# Patient Record
Sex: MALE | Race: WHITE | NOT HISPANIC OR LATINO | Employment: FULL TIME | URBAN - METROPOLITAN AREA
[De-identification: names, ages, dates, MRNs, and addresses within clinical notes are randomized per-mention and may not be internally consistent; named-entity substitution may affect disease eponyms.]

---

## 2017-05-30 ENCOUNTER — AMBULATORY - RIVER FALLS (OUTPATIENT)
Dept: FAMILY MEDICINE | Facility: CLINIC | Age: 37
End: 2017-05-30
Payer: COMMERCIAL

## 2018-02-15 ENCOUNTER — OFFICE VISIT - HEALTHEAST (OUTPATIENT)
Dept: FAMILY MEDICINE | Facility: CLINIC | Age: 38
End: 2018-02-15

## 2018-02-15 DIAGNOSIS — R05.9 COUGH: ICD-10-CM

## 2018-02-15 DIAGNOSIS — J20.9 ACUTE BRONCHITIS: ICD-10-CM

## 2018-02-15 DIAGNOSIS — J01.90 ACUTE SINUSITIS: ICD-10-CM

## 2018-02-15 LAB
FLUAV AG SPEC QL IA: NORMAL
FLUBV AG SPEC QL IA: NORMAL

## 2018-02-28 ENCOUNTER — COMMUNICATION - HEALTHEAST (OUTPATIENT)
Dept: FAMILY MEDICINE | Facility: CLINIC | Age: 38
End: 2018-02-28

## 2018-02-28 DIAGNOSIS — J20.9 ACUTE BRONCHITIS: ICD-10-CM

## 2019-06-16 ENCOUNTER — OFFICE VISIT - RIVER FALLS (OUTPATIENT)
Dept: FAMILY MEDICINE | Facility: CLINIC | Age: 39
End: 2019-06-16
Payer: COMMERCIAL

## 2019-06-16 ENCOUNTER — COMMUNICATION - RIVER FALLS (OUTPATIENT)
Dept: FAMILY MEDICINE | Facility: CLINIC | Age: 39
End: 2019-06-16
Payer: COMMERCIAL

## 2019-06-16 LAB
ALBUMIN UR-MCNC: ABNORMAL G/DL
ANION GAP SERPL CALCULATED.3IONS-SCNC: 12 MMOL/L (ref 5–18)
BACTERIA #/AREA URNS HPF: NORMAL /[HPF]
BASOPHILS # BLD MANUAL: 0.1 10*3/UL
BASOPHILS NFR BLD MANUAL: 0.5 %
BILIRUB UR QL STRIP: NEGATIVE
BUN SERPL-MCNC: 9 MG/DL (ref 8–25)
BUN/CREAT RATIO - HISTORICAL: 8 (ref 10–20)
CALCIUM SERPL-MCNC: 9.5 MG/DL (ref 8.5–10.5)
CHLORIDE BLD-SCNC: 100 MMOL/L (ref 98–110)
CO2 SERPL-SCNC: 27 MMOL/L (ref 21–31)
CREAT SERPL-MCNC: 1.08 MG/DL (ref 0.72–1.25)
EOSINOPHIL # BLD MANUAL: 0.1 10*3/UL
EOSINOPHIL NFR BLD MANUAL: 1 %
EPITHELIAL CELLS UR: NORMAL
ERYTHROCYTE [DISTWIDTH] IN BLOOD BY AUTOMATED COUNT: 16.8 % (ref 11.5–15.5)
GFR ESTIMATE EXT - HISTORICAL: >60
GLUCOSE BLD-MCNC: 99 MG/DL (ref 65–100)
GLUCOSE UR STRIP-MCNC: NEGATIVE MG/DL
HCT VFR BLD AUTO: 48.6 % (ref 37–53)
HGB BLD-MCNC: 16.9 G/DL (ref 13.5–17.5)
HGB UR QL STRIP: ABNORMAL
KETONES UR STRIP-MCNC: NEGATIVE MG/DL
LEUKOCYTE ESTERASE UR QL STRIP: ABNORMAL
LYMPHOCYTES # BLD MANUAL: 1.7 10*3/UL (ref 0.9–2.9)
LYMPHOCYTES NFR BLD MANUAL: 12.1 %
MCH RBC QN AUTO: 30.9 PG (ref 26–34)
MCHC RBC AUTO-ENTMCNC: 34.8 G/DL (ref 32–36)
MCV RBC AUTO: 89 FL (ref 80–100)
MONOCYTES # BLD MANUAL: 1.3 10*3/UL
MONOCYTES NFR BLD MANUAL: 9.5 %
NEUTROPHILS # BLD MANUAL: 10.5 10*3/UL (ref 1.7–7)
NEUTROPHILS NFR BLD MANUAL: 76.9 %
NITRATE UR QL: NEGATIVE
PH UR STRIP: 7.5 [PH] (ref 5–8)
PLATELET # BLD AUTO: 393 10*3/UL (ref 140–440)
PMV BLD: 9.2 FL (ref 6.5–11)
POTASSIUM BLD-SCNC: 3.8 MMOL/L (ref 3.5–5)
RBC # BLD AUTO: 5.47 10*6/UL (ref 4.3–5.9)
RBC #/AREA URNS AUTO: NORMAL /[HPF]
SODIUM SERPL-SCNC: 139 MMOL/L (ref 135–145)
SP GR UR STRIP: 1.01 (ref 1–1.03)
WBC # BLD AUTO: 13.6 10*3/UL (ref 4.5–11)
WBC #/AREA URNS AUTO: NORMAL /[HPF]
WBC CLUMPS #/AREA URNS HPF: PRESENT /[HPF]

## 2019-06-16 ASSESSMENT — MIFFLIN-ST. JEOR: SCORE: 1986.37

## 2019-06-19 LAB — BACTERIA SPEC CULT: NORMAL

## 2020-05-27 ENCOUNTER — OFFICE VISIT - RIVER FALLS (OUTPATIENT)
Dept: FAMILY MEDICINE | Facility: CLINIC | Age: 40
End: 2020-05-27
Payer: COMMERCIAL

## 2020-05-27 LAB
D DIMER PPP FEU-MCNC: 0.35
ERYTHROCYTE [DISTWIDTH] IN BLOOD BY AUTOMATED COUNT: 13.3 % (ref 11.5–15.5)
HCT VFR BLD AUTO: 50 % (ref 37–53)
HGB BLD-MCNC: 17.8 G/DL (ref 13.5–17.5)
MCH RBC QN AUTO: 32.3 PG (ref 26–34)
MCHC RBC AUTO-ENTMCNC: 35.6 G/DL (ref 32–36)
MCV RBC AUTO: 91 FL (ref 80–100)
PLATELET # BLD AUTO: 236 10*3/UL (ref 140–440)
PMV BLD: 9.4 FL (ref 6.5–11)
RBC # BLD AUTO: 5.51 10*6/UL (ref 4.3–5.9)
TROPONIN I - HISTORICAL: <0.01 NG/ML
WBC # BLD AUTO: 10.1 10*3/UL (ref 4.5–11)

## 2020-05-27 ASSESSMENT — MIFFLIN-ST. JEOR: SCORE: 1956.43

## 2021-02-12 ENCOUNTER — COMMUNICATION - RIVER FALLS (OUTPATIENT)
Dept: FAMILY MEDICINE | Facility: CLINIC | Age: 41
End: 2021-02-12
Payer: COMMERCIAL

## 2021-02-12 ENCOUNTER — OFFICE VISIT - RIVER FALLS (OUTPATIENT)
Dept: FAMILY MEDICINE | Facility: CLINIC | Age: 41
End: 2021-02-12
Payer: COMMERCIAL

## 2021-03-10 ENCOUNTER — COMMUNICATION - RIVER FALLS (OUTPATIENT)
Dept: FAMILY MEDICINE | Facility: CLINIC | Age: 41
End: 2021-03-10
Payer: COMMERCIAL

## 2021-04-09 ENCOUNTER — COMMUNICATION - RIVER FALLS (OUTPATIENT)
Dept: FAMILY MEDICINE | Facility: CLINIC | Age: 41
End: 2021-04-09
Payer: COMMERCIAL

## 2021-04-12 ENCOUNTER — OFFICE VISIT - RIVER FALLS (OUTPATIENT)
Dept: FAMILY MEDICINE | Facility: CLINIC | Age: 41
End: 2021-04-12
Payer: COMMERCIAL

## 2021-06-01 VITALS — BODY MASS INDEX: 30.4 KG/M2 | WEIGHT: 230.4 LBS

## 2021-06-26 NOTE — PROGRESS NOTES
Progress Notes by Daxa Lowry MD at 2/15/2018 12:00 PM     Author: Daxa Lowry MD Service: -- Author Type: Physician    Filed: 2/15/2018  2:20 PM Encounter Date: 2/15/2018 Status: Signed    : Daxa Lowry MD (Physician)       Provider wore a mask during this visit.     Subjective:   Joe Jesus is a 37 y.o. male and is new to [facility].  Roomed by: Clare GRIFFIN     Refills needed? No    Do you have any forms that need to be filled out? No      Chief Complaint   Patient presents with   ? Cough     runny nose, sinus pressure, SOB, fatigue 5/d's,  no sore throat, tightness in chest coughing up red mucos   Sick with a cold around  and got better. Then sick with cold symptoms early January and got better. Then sick with another cold around  and felt fatigued and left work early. Then got better went to [place] from - and felt better. Then started feeling bad on . Has been feeling fatigued and having lots of nasal congestion since . Has been coughing mucous up. Last night started coughing worse. Admits chest tightness, SOB and fatigue. Says the burning the lungs has worried him. 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 Says he doesn't cough all of the time, but when he does, it's in fits. Denies any sweats or chills, headaches or dizziness. . Admits body aches on and off since . Says sense of smell and taste has been down. Denies nausea, vomiting, diarrhea or belly pain.     PMH - ADHD  PSH -  As child - T&A,  - knee surgeries,  - right torn biceps.  - back surgery for bulging disc  FX - HTN - neg, DM - neg, Cancer - neg, CAD - paternal GF  of MI age 50  Review of Systems  Const - Resp - see HPI  No Known Allergies    Current Outpatient Prescriptions:   ?  dextroamphetamine-amphetamine (ADDERALL XR) 30 MG 24 hr capsule, Take 20 mg by mouth daily. Pt. Taking 2 times a day, Disp: , Rfl:   There is no problem list on file for this  patient.    Objective:     Vitals:    02/15/18 1210   BP: 132/80   Patient Site: Right Arm   Patient Position: Sitting   Cuff Size: Adult Large   Pulse: (!) 102   Resp: 18   Temp: 98.3  F (36.8  C)   TempSrc: Oral   SpO2: 98%   Weight: (!) 230 lb 6.4 oz (104.5 kg)   Gen - Pt in NAD  Eyes - Conjunctiva non injected, no drainage  Face - not TTP over frontal sinus areas; not TTP over maxillary area  Ears - external canals - no induration, Right TM - not injected, Left TM - not injected   Nose - not congested, no nasal drainage  Pharynx - non injected, tonsils 1+ size  Neck - supple, no cervical adenopathy, no masses  Cor - RRR w/o murmur  Lungs - Fair to good air entry; spare diffuse expiratory wheezes, but no crackles noted on auscultation - sporadic dry coughing noted  Skin - no lesions, no rashes noted    Results for orders placed or performed in visit on 02/15/18   Influenza A/B Rapid Test   Result Value Ref Range    Influenza  A, Rapid Antigen No Influenza A antigen detected No Influenza A antigen detected    Influenza B, Rapid Antigen No Influenza B antigen detected No Influenza B antigen detected   Lab result discussed on day of visit.      Assessment - Plan   Medical Decision Making -37-year-old new male patient to [facility] presents with having had several episodes of URI symptoms since [place] time.  Admits that the last time that he got sick with URI symptoms since 1/26, him his symptoms have actually gotten worse and he started to having more cough, chest tightness and shortness of breath.  Patient also has symptoms of sinusitis with nasal congestion, fatigue and decreased sense of smell.  Patient did have some wheezing expiratory wheezing on exam and admitted feeling much better after getting an albuterol neb.  We will treat patient for acute sinusitis with antibiotics and acute bronchitis with an albuterol inhaler.    1. Acute sinusitis  - doxycycline (VIBRA-TABS) 100 MG tablet; Take 1 tablet (100 mg  total) by mouth 2 (two) times a day for 7 days.  Dispense: 14 tablet; Refill: 0    2. Acute bronchitis  - albuterol nebulizer solution 2.5 mg (PROVENTIL); Take 3 mL (2.5 mg total) by nebulization once.  - albuterol (PROAIR HFA;PROVENTIL HFA;VENTOLIN HFA) 90 mcg/actuation inhaler; Inhale 1-2 puffs every 4 (four) hours as needed for wheezing or shortness of breath (or coughing).  Dispense: 1 Inhaler; Refill: 0  - Spacer W/O Mask    3. Cough  - Influenza A/B Rapid Test    At the conclusion of the encounter, assessment and plan were discussed. All questions were answered. The patient or guardian acknowledged understanding and was involved in the decision making regarding the overall care plan.    Patient Instructions     1. Keep well hydrated  2.  May alternate Tylenol every 6 hours with ibuprofen every 6 hours as needed for fever or pain  3. If symptoms not improved after completing antibiotics, follow up with primary  4. If you have any questions, call the clinic number - it's answered 24/7      Sinus infection  Acute bacterial rhinosinusitis (ABRS) is an infection of your nasal cavity and sinuses. Its caused by bacteria. Acute means that youve had symptoms for less than 12 weeks.  Understanding your sinuses  The nasal cavity is the large air-filled space behind your nose. The sinuses are a group of spaces formed by the bones of your face. They connect with your nasal cavity. ABRS causes the tissue lining these spaces to become inflamed. Mucus may not drain normally. This leads to facial pain and other symptoms.  What causes ABRS?  ABRS most often follows an upper respiratory infection caused by a virus. Bacteria then infect the lining of your nasal cavity and sinuses. But you can also get ABRS if you have:    Nasal allergies    Long-term nasal swelling and congestion not caused by allergies    Blockage in the nose  Symptoms of ABRS  The symptoms of ABRS may be different for each person, and can include:    Nasal  congestion    Runny nose    Fluid draining from the nose down the throat (postnasal drip)    Headache    Cough    Pain in the sinuses    Thick, colored fluid from the nose (mucus)    Fever  Diagnosing ABRS  ABRS may be diagnosed if youve had an upper respiratory infection like a cold and cough for longer than 10 to 14 days. Your health care provider will ask about your symptoms and your medical history. The provider will check your vital signs, including your temperature. Youll have a physical exam. The health care provider will check your ears, nose, and throat. You likely wont need any tests. If ABRS comes back, you may have a culture or other tests.  Treatment for ABRS  Treatment may include:    Antibiotic medicine. This is for symptoms that last for at least 10 to 14 days.    Nasal corticosteroid medicine. Drops or spray used in the nose can lessen swelling and congestion.    Over-the-counter pain medicine. This is to lessen sinus pain and pressure.    Nasal decongestant medicine. Spray or drops may help to lessen congestion. Do not use them for more than a few days.    Salt wash (saline irrigation). This can help to loosen mucus.  Possible complications of ABRS  ABRS may come back or become long-term (chronic).  In rare cases, ABRS may cause complications such as:     Inflamed tissue around the brain and spinal cord (meningitis)    Inflamed tissue around the eyes (orbital cellulitis)    Inflamed bones around the sinuses (osteitis)  These problems may need to be treated in a hospital with intravenous (IV) antibiotic medicine or surgery.  When to call the health care provider  Call your health care provider if you have any of the following:    Symptoms that dont get better, or get worse    Symptoms that dont get better after 3 to 5 days on antibiotics    Trouble seeing    Swelling around your eyes    Confusion or trouble staying awake   Date Last Reviewed: 3/3/2015    5016-0219 [employer] [address]  [place] [address]. All rights reserved. This information is not intended as a substitute for professional medical care. Always follow your healthcare professional's instructions.      What Is Acute Bronchitis?  Acute or short-term bronchitis last for days or weeks. It occurs when the bronchial tubes (airways in the lungs) are irritated by a virus, bacteria, or allergen. This causes a cough that produces yellow or greenish mucus.  Inside healthy lungs    Air travels in and out of the lungs through the airways. The linings of these airways produce sticky mucus. This mucus traps particles that enter the lungs. Tiny structures called cilia then sweep the particles out of the airways.     Healthy airway: Airways are normally open. Air moves in and out easily.      Healthy cilia: Tiny, hairlike cilia sweep mucus and particles up and out of the airways.   Lungs with bronchitis  Bronchitis often occurs with a cold or the flu virus. The airways become inflamed (red and swollen). There is a deep hacking cough from the extra mucus. Other symptoms may include:    Wheezing    Chest discomfort    Shortness of breath    Mild fever  A second infection, this time due to bacteria, may then occur. And airways irritated by allergens or smoke are more likely to get infected.        Inflamed airway: Inflammation and extra mucus narrow the airway, causing shortness of breath.      Impaired cilia: Extra mucus impairs cilia, causing congestion and wheezing. Smoking makes the problem worse.   Making a diagnosis  A physical exam, health history, and certain tests help your healthcare provider make the diagnosis.  Health history  Your healthcare provider will ask you about your symptoms.  The exam  Your provider listens to your chest for signs of congestion. He or she may also check your ears, nose, and throat.  Possible tests    A sputum test for bacteria. This requires a sample of mucus from the lungs.    A nasal or  throat swab for bacterial infection.    A chest X-ray if your healthcare provider thinks you have pneumonia.    Tests to check for an underlying condition, such as allergies, asthma, or COPD. You may need to see a specialist for more lung function testing.  Treating a cough  The main treatment for bronchitis is easing symptoms. Avoiding smoke, allergens, and other things that trigger coughing can often help. If the infection is bacterial, you may be given antibiotics. During the illness, it's important to get plenty of sleep. To ease symptoms:    Dont smoke, and avoid secondhand smoke.    Use a humidifier, or breathe in steam from a hot shower. This may help loosen mucus.    Drink a lot of water and juice. They can soothe the throat and may help thin mucus.    Sit up or use extra pillows when in bed to help lessen coughing and congestion.    Ask your provider about using cough medicine, pain and fever medicine, or a decongestant.  Antibiotics  Most cases of bronchitis are caused by cold or flu viruses. Antibiotics dont treat viral illness. Taking antibiotics when they are not needed increases your risk of getting an infection later that is antibiotic-resistant. Your provider will prescribe antibiotics if the infection is caused by bacteria. If they are prescribed:    Take the medicine until it is used up, even if symptoms have improved. If you dont, the bronchitis may come back.    Take them as directed. For instance, some medicines should be taken with food.    Ask your provider or pharmacist what side effects are common, and what to do about them.  Follow-up care  You should see your provider again in 2 to 3 weeks. By this time, symptoms should have improved. An infection that lasts longer may mean you have a more serious problem.  Prevention    Avoid tobacco smoke. If you smoke, quit. Stay away from smoky places. Ask friends and family not to smoke around you, or in your home or car.    Get checked  for allergies.    Ask your provider about getting a yearly flu shot, and pneumococcal or pneumonia shots.    Wash your hands often. This helps reduce the chance of picking up viruses that cause colds and flu.  Call your healthcare provider if:    Symptoms worsen, or new symptoms develop.    Breathing problems worsen or  become severe.    Symptoms dont get better within a week, or within 3 days of taking antibiotics.   Date Last Reviewed: 6/18/2014 2000-2016 [employer]. [address] [place] [address], [place] [address]. All rights reserved. This information is not intended as a substitute for professional medical care. Always follow your healthcare professional's instructions.

## 2021-07-14 ENCOUNTER — OFFICE VISIT - RIVER FALLS (OUTPATIENT)
Dept: FAMILY MEDICINE | Facility: CLINIC | Age: 41
End: 2021-07-14
Payer: COMMERCIAL

## 2021-07-16 ENCOUNTER — COMMUNICATION - RIVER FALLS (OUTPATIENT)
Dept: FAMILY MEDICINE | Facility: CLINIC | Age: 41
End: 2021-07-16
Payer: COMMERCIAL

## 2021-11-17 ENCOUNTER — OFFICE VISIT - RIVER FALLS (OUTPATIENT)
Dept: FAMILY MEDICINE | Facility: CLINIC | Age: 41
End: 2021-11-17
Payer: COMMERCIAL

## 2022-02-12 VITALS
BODY MASS INDEX: 29.55 KG/M2 | HEART RATE: 99 BPM | TEMPERATURE: 98.1 F | DIASTOLIC BLOOD PRESSURE: 92 MMHG | WEIGHT: 223 LBS | OXYGEN SATURATION: 96 % | SYSTOLIC BLOOD PRESSURE: 140 MMHG | HEIGHT: 73 IN

## 2022-02-12 VITALS
BODY MASS INDEX: 30.43 KG/M2 | HEART RATE: 82 BPM | WEIGHT: 229.6 LBS | SYSTOLIC BLOOD PRESSURE: 140 MMHG | DIASTOLIC BLOOD PRESSURE: 90 MMHG | TEMPERATURE: 98.7 F | HEIGHT: 73 IN

## 2022-02-15 NOTE — TELEPHONE ENCOUNTER
---------------------  From: Victoria Amato MA (eRx Pool ([id number]_[facility]))   To: MARIBELL Message Pool ([id number]_[place] [facility] [place]);     Sent: 9/14/2021 9:32:11 AM CDT  Subject: FW: Medication Management   Due Date/Time: 9/14/2021 1:31:00 PM CDT           ------------------------------------------  From: [id number]  To: Kevin Izaguirre MD  Sent: September 12, 2021 1:31:12 PM CDT  Subject: Medication Management  Due: August 20, 2021 11:16:59 AM CDT     ** On Hold Pending Signature **     Dispensed Drug: sildenafil (sildenafil 20 mg oral tablet), TAKE 1 TABLET BY MOUTH EVERY DAY  Quantity: 60 tab(s)  Days Supply: 60  Refills: 0  Substitutions Allowed  Notes from Pharmacy:  ---------------------------------------------------------------  From: Victoria Amato MA   To: [facility] [id number] [facility]    Sent: 9/14/2021 10:46:46 AM CDT  Subject: FW: Medication Management     ** Submitted: **  Order:sildenafil (sildenafil 20 mg oral tablet)  1 tab(s)  Oral  daily  Qty:  30 tab(s)        Refills:  1          Substitutions Allowed     Route To [facility] [id number] [facility]    Signed by Victoria Amato MA  9/14/2021 3:46:00 PM [facility]    ** Submitted: **  Complete:sildenafil (sildenafil 20 mg oral tablet)   Signed by Victoria Amato MA  9/14/2021 3:46:00 PM [facility]    ** Not Approved:  **  sildenafil (SILDENAFIL 20 MG TABLET)  TAKE 1 TABLET BY MOUTH EVERY DAY  Qty:  60 tab(s)        Days Supply:  60        Refills:  0          Substitutions Allowed     Route To Pharmacy - [facility] [id number]   Signed by Victoria Amato MA

## 2022-02-15 NOTE — PROGRESS NOTES
Patient:   TOM MICHELE            MRN: [id number]            FIN: [id number]               Age:   40 years     Sex:  Male     :  1980   Associated Diagnoses:   Mild recurrent major depression; Obsessive-Compulsive Disorder; Adult ADHD   Author:   Phillip Oliveros PA-C      Visit Information      Date of Service: 2021 12:22 pm  Performing Location: [facility]  Encounter#: [id number]      Primary Care Provider (PCP):  Kevin Izaguirre MD    NPI# [id number]      Referring Provider:  Phillip Oliveros PA-C    NPI# [id number]   Visit type:  Video Visit via [employer] or [employer].    Participants in room during visit:  2_   Location of patient:  Parked vehicle in [place]  Location of provider:  _ (Clinic office )  Video Start Time:    Video End Time:   1430    Today's visit was conducted via video conference due to the COVID-19 pandemic.  The patient's consent to proceed with a video visit has been obtained and documented.      Chief Complaint   Medication refills      History of Present Illness   Patient is a 40 year old M who is being evaluated via a billable video visit. Needs medications refilled. Primary [facility]. Was seeing psychiatry. They apparently went out of business. Unable to get into them. Reviewed his medications and PDMP. Not suicidal. Doing well. Will FU with [facility] going forward unless new clinic has psychiatry. Wife feels he needs to go back on his Lexapro.      Review of Systems   Constitutional:  Negative.    Eye:  Negative.    Ear/Nose/Mouth/Throat:  Negative.    Respiratory:  Negative.    Cardiovascular:  Negative.    Gastrointestinal:  Negative.    Genitourinary:  Negative.    Immunologic:  Negative.    Musculoskeletal:  Negative.    Integumentary:  Negative.    Neurologic:  Negative.    Psychiatric:  Negative except as documented in history of present illness.       Health Status   Allergies:    Allergic Reactions (Selected)  No known allergies   Medications:  (Selected)    Prescriptions  Prescribed  Lexapro 10 mg oral tablet: = 1 tab(s) ( 10 mg ), Oral, daily, # 90 tab(s), 3 Refill(s), Type: Maintenance, Pharmacy[id number], 1 tab(s) Oral daily, 72.75, in, 05/27/20 10:28:00 CDT, Height Measured, 223, lb, 05/27/20 10:28:00 CDT, Weight Measured  amphetamine-dextroamphetamine 30 mg oral tablet: = 1 tab(s) ( 30 mg ), Oral, bid, Instructions: Fill in thirty days, # 60 tab(s), 0 Refill(s), Type: Maintenance, Pharmacy[id number], 1 tab(s) Oral bid,x30 day(s),Instr:Fill in thirty days, 72.75, in, 05/27/20 10:28:00 CDT, Height Measured,...  Documented Medications  Documented  amphetamine-dextroamphetamine 15 mg oral tablet: 0 Refill(s), Type: Soft Stop  sildenafil 20 mg oral tablet: = 1 tab(s) ( 20 mg ), Oral, daily, 0 Refill(s), Type: Maintenance   Problem list:    All Problems  Tobacco Use Disorder, Continuous / ICD-9-.1 / Confirmed  Tinea corporis / ICD-9-.5 / Confirmed  Sprain of Neck / ICD-9-.0 / Confirmed  Rotator Cuff Tendinitis / ICD-9-.10 / Confirmed  Obsessive-Compulsive Disorder / ICD-9-.3 / Confirmed  Obese / ICD-9-.00 / Probable  Degenerative Disc Disease / ICD-9-.6 / Confirmed  Cervical Pain / ICD-9-.1 / Confirmed  Cervical disc degeneration / ICD-9-.4 / Confirmed      Histories   Past Medical History:    Active  Cervical Pain (723.1)  Cervical disc degeneration (722.4)  Sprain of Neck (847.0)  Tinea corporis (110.5)  Tobacco Use Disorder, Continuous (305.1)  Obsessive-Compulsive Disorder (300.3)  Degenerative Disc Disease (722.6)  Comments:  7/9/2010 CDT 12:35 PM CDT - Suly Estrada CMA  C4-5  Rotator Cuff Tendinitis (726.10)   Family History:       Procedure history:    History of knee surgery ([id number]).  Comments:  9/17/2010 2:25 PM CDT - Estela Carey   Social History:        Electronic Cigarette/Vaping Assessment            Electronic Cigarette Use: Never.      Alcohol Assessment: Denies  Alcohol Use      Tobacco Assessment: Current            chewing tobacco, Snuff, 10 year(s).      Substance Abuse Assessment: Denies Substance Abuse      Exercise and Physical Activity Assessment: Regular exercise        Physical Examination   General:  Alert and oriented, No acute distress.    Eye:  Pupils are equal, round and reactive to light, Normal conjunctiva.    HENT:  Oral mucosa is moist.    Neck:  Supple.    Respiratory:  Respirations are non-labored.    Psychiatric:  Cooperative, Appropriate mood & affect, Normal judgment.       Impression and Plan   Diagnosis     Mild recurrent major depression (YWI45-CZ F33.0).     Obsessive-Compulsive Disorder (FTX41-PJ F42.9).     Adult ADHD (PDR86-CC F90.9).     Patient Instructions:       Counseled: Patient, Regarding diagnosis, Regarding treatment, Regarding medications, Verbalized understanding.    Orders     Orders (Selected)   Prescriptions  Prescribed  Lexapro 10 mg oral tablet: = 1 tab(s) ( 10 mg ), Oral, daily, # 90 tab(s), 3 Refill(s), Type: Maintenance, Pharmacy: [facility], 1 tab(s) Oral daily, 72.75, in, 05/27/20 10:28:00 CDT, Height Measured, 223, lb, 05/27/20 10:28:00 CDT, Weight Measured  amphetamine-dextroamphetamine 30 mg oral tablet: = 1 tab(s) ( 30 mg ), Oral, bid, Instructions: Fill in thirty days, # 60 tab(s), 0 Refill(s), Type: Maintenance, Pharmacy: [facility], 1 tab(s) Oral bid,x30 day(s),Instr:Fill in thirty days, 72.75, in, 05/27/20 10:28:00 CDT, Height Measured,...  Documented Medications  Deleted  Adderall 15 mg oral tablet: = 1 tab(s) ( 15 mg ), Oral, daily, 0 Refill(s), Type: Maintenance  Adderall 30 mg oral tablet: = 1 tab(s) ( 30 mg ), Oral, bid, # 60 tab(s), 0 Refill(s), Type: Maintenance.     Plan:  RTC in sixty days and PRN..       Health Maintenance      Recommendations     Pending (in the next year)        OverDue           Alcohol Misuse Screen due  07/09/11  and every 1  year(s)           Depression Screen  (Male) due  03/08/12  and every 1  year(s)           Influenza Vaccine due  09/01/20  and every 1  year(s)        Due            Exercise due  02/12/21  and every 1  year(s)           Lipid Disorders Screen (Male) due  02/12/21  and every 1  year(s)           Preventative Services due  02/12/21  and every 5  year(s)           Type 2 Diabetes Mellitus Screen (Male) due  02/12/21  Variable frequency        Due In Future            Body Mass Index Check (Male) not due until  05/27/21  and every 1  year(s)           High Blood Pressure Screen (Male) not due until  05/27/21  and every 1  year(s)           Tetanus Vaccine not due until  07/01/21  and every 10  year(s)     Satisfied (in the past 1 year)        Satisfied            Body Mass Index Check (Male) on  05/27/20.           High Blood Pressure Screen (Male) on  05/27/20.           Obesity Screen and Counseling (Male) on  05/27/20.

## 2022-02-15 NOTE — NURSING NOTE
Comprehensive Intake Entered On:  4/12/2021 8:41 AM CDT    Performed On:  4/12/2021 8:37 AM CDT by Svitlana Garza LPN               Summary   Chief Complaint :   verbal consent given for video visit. Medication check and refills.   Svitlana Garza LPN - 4/12/2021 8:37 AM CDT   Health Status   Allergies Verified? :   Yes   Medication History Verified? :   Yes   Immunizations Current :   Yes   Pre-Visit Planning Status :   Completed   Tobacco Use? :   Current every day smoker   Tobacco Cessation Review :   Not ready to quit, Advised to quit   Svitlana Garza LPN - 4/12/2021 8:37 AM CDT   Meds / Allergies   (As Of: 4/12/2021 8:41:12 AM CDT)   Allergies (Active)   No known allergies  Estimated Onset Date:   Unspecified ; Created By:   Suly Estrada CMA; Reaction Status:   Active ; Category:   Drug ; Substance:   No known allergies ; Type:   Allergy ; Updated By:   Suly Estrada CMA; Reviewed Date:   4/12/2021 8:38 AM CDT        Medication List   (As Of: 4/12/2021 8:41:12 AM CDT)   Prescription/Discharge Order    amphetamine-dextroamphetamine  :   amphetamine-dextroamphetamine ; Status:   Prescribed ; Ordered As Mnemonic:   amphetamine-dextroamphetamine 15 mg oral tablet ; Simple Display Line:   15 mg, 1 tab(s), Oral, daily, 30 tab(s), 0 Refill(s) ; Ordering Provider:   Kevin Izaguirre MD; Catalog Code:   amphetamine-dextroamphetamine ; Order Dt/Tm:   3/10/2021 1:01:56 PM CST          amphetamine-dextroamphetamine  :   amphetamine-dextroamphetamine ; Status:   Prescribed ; Ordered As Mnemonic:   amphetamine-dextroamphetamine 30 mg oral tablet ; Simple Display Line:   30 mg, 1 tab(s), Oral, bid, 60 tab(s), 0 Refill(s) ; Ordering Provider:   Kevin Izaguirre MD; Catalog Code:   amphetamine-dextroamphetamine ; Order Dt/Tm:   3/10/2021 1:02:13 PM CST          amphetamine-dextroamphetamine  :   amphetamine-dextroamphetamine ; Status:   Prescribed ; Ordered As Mnemonic:   amphetamine-dextroamphetamine 30 mg oral  tablet ; Simple Display Line:   30 mg, 1 tab(s), Oral, bid, for 30 day(s), Fill in thirty days, 60 tab(s), 0 Refill(s) ; Ordering Provider:   Phillip Oliveros PA-C; Catalog Code:   amphetamine-dextroamphetamine ; Order Dt/Tm:   2/12/2021 1:37:02 PM CST ; Comment:   Responsible Provider: DICKSON BARBER          escitalopram  :   escitalopram ; Status:   Prescribed ; Ordered As Mnemonic:   Lexapro 10 mg oral tablet ; Simple Display Line:   10 mg, 1 tab(s), Oral, daily, 90 tab(s), 3 Refill(s) ; Ordering Provider:   Phillip Oliveros PA-C; Catalog Code:   escitalopram ; Order Dt/Tm:   2/12/2021 1:35:28 PM CST            Home Meds    amphetamine-dextroamphetamine  :   amphetamine-dextroamphetamine ; Status:   Documented ; Ordered As Mnemonic:   amphetamine-dextroamphetamine 15 mg oral tablet ; Simple Display Line:   0 Refill(s) ; Catalog Code:   amphetamine-dextroamphetamine ; Order Dt/Tm:   2/12/2021 1:27:55 PM CST ; Comment:   Responsible Provider: DICKSON BARBER          sildenafil  :   sildenafil ; Status:   Documented ; Ordered As Mnemonic:   sildenafil 20 mg oral tablet ; Simple Display Line:   20 mg, 1 tab(s), Oral, daily, 0 Refill(s) ; Catalog Code:   sildenafil ; Order Dt/Tm:   6/16/2019 4:30:39 PM CDT            ID Risk Screen   Recent Travel History :   No recent travel   Family Member Travel History :   No recent travel   Other Exposure to Infectious Disease :   Exposure to respiratory illness of unknown etiology   COVID-19 Testing Status :   Positive COVID-19 test greater than 30 days ago   Svitlana Garza LPN - 4/12/2021 8:37 AM CDT

## 2022-02-15 NOTE — RESULTS
Patient:   TOM MICHELE            MRN: [id number]            FIN: [id number]               Age:   39 years     Sex:  Male     :  1980   Associated Diagnoses:   None   Author:   Zina GUZMAN, Kevin      Procedure   EKG procedure   Date:  2020.     Indication: chest pain.     EKG findings   Rhythm: heart rate  98  beats/min, sinus normal.     Axis: normal axis, normal configuration.     Intervals: normal.     P waves: normal.     QRS complex: normal.     ST-T-U complex: non-specific ST-T wave abnormalities.     Interpretation: borderline.

## 2022-02-15 NOTE — TELEPHONE ENCOUNTER
Entered by Daniela Miguel on March 10, 2021 9:51:48 AM CST  LF 2/21/21 #60  LF 2/21/21 video visit  No RTC  Please advise           ---------------------  From: TOM MICHELE  To: [facility]  Sent: 03/10/2021 08:59 a.m. CST  Subject: Refill  Hello, I m messaging to request a refill on my amphetamine salt combo. My usual dosage is 60 30mg tablets and 30 15mg tablets. I did get the 30 s last month. Thank you. Please call if you have any questions.---------------------  From: Daniela Miguel ([employer] [facility] ([id number]))   To: [facility] ([id number]);     Sent: 3/10/2021 9:51:56 AM CST  Subject: FW: Refill---------------------  From: Victoria Amato MA ([employer] ([id number]))   To: Kevin Izaguirre MD;     Sent: 3/10/2021 10:19:30 AM CST  Subject: FW: Refill  ** Submitted: **  Order:amphetamine-dextroamphetamine (amphetamine-dextroamphetamine 30 mg oral tablet)  1 tab(s)  Oral  bid  Qty:  60 tab(s)        Refills:  0          Substitutions Allowed     [id number] [place]    Signed by Kevin Izaguirre MD  3/10/2021 7:01:00 PM [facility]    ** Submitted: **  Order:amphetamine-dextroamphetamine (amphetamine-dextroamphetamine 15 mg oral tablet)  1 tab(s)  Oral  daily  Qty:  30 tab(s)        Refills:  0          Substitutions Allowed     [id number]    Signed by Kevin Izaguirre MD  3/10/2021 7:01:00 PM [facility]---------------------  From: Kevin Izaguirre MD   To: [id number]);     Sent: 3/10/2021 1:05:41 PM CST  Subject: RE: Refill     He will need a visit next month---------------------  From: Lima COLON, Victoria ([facility] Message Pool ([id number]_[facility]))   To: TOM MICHELE    Sent: 3/10/2021 1:27:06 PM CST  Subject: FW: Refill     Dr. Zina Chase received your message and did send in prescription refill to [facility].  He does want to see you next month for  your next set of refills.    Sincerely,     Victoria CULLEN CMA

## 2022-02-15 NOTE — NURSING NOTE
Comprehensive Intake Entered On:  6/16/2019 4:32 PM CDT    Performed On:  6/16/2019 4:24 PM CDT by Emma Castle CMA               Summary   Chief Complaint :   did have blood in urine, foul smelling urine since Tues but has since improved, RLQ pain yesterday/lower back pain, constipation since Friday     Weight Measured :   229.6 lb(Converted to: 229 lb 10 oz, 104.14 kg)    Height Measured :   72.75 in(Converted to: 6 ft 1 in, 184.78 cm)    Body Mass Index :   30.5 kg/m2 (HI)    Body Surface Area :   2.31 m2   Systolic Blood Pressure :   140 mmHg (HI)    Diastolic Blood Pressure :   90 mmHg (HI)    Mean Arterial Pressure :   107 mmHg   Peripheral Pulse Rate :   82 bpm   BP Site :   Right arm   Pulse Site :   Radial artery   BP Method :   Manual   HR Method :   Manual   Temperature Tympanic :   98.7 DegF(Converted to: 37.1 DegC)    Emma Castle CMA - 6/16/2019 4:24 PM CDT   Health Status   Allergies Verified? :   Yes   Medication History Verified? :   Yes   Immunizations Current :   Yes   Medical History Verified? :   No   Pre-Visit Planning Status :   Not completed   Tobacco Use? :   Unknown if ever smoked   Emma Castle CMA - 6/16/2019 4:24 PM CDT   Meds / Allergies   (As Of: 6/16/2019 4:32:25 PM CDT)   Allergies (Active)   No known allergies  Estimated Onset Date:   Unspecified ; Created By:   Suly Estrada CMA; Reaction Status:   Active ; Category:   Drug ; Substance:   No known allergies ; Type:   Allergy ; Updated By:   Suly Estrada CMA; Reviewed Date:   6/16/2019 4:29 PM CDT        Medication List   (As Of: 6/16/2019 4:32:25 PM CDT)   Prescription/Discharge Order    fluoxetine  :   fluoxetine ; Status:   Completed ; Ordered As Mnemonic:   fluoxetine 20 mg oral capsule ; Simple Display Line:   20 mg, 1 cap(s), PO, daily, 30 cap(s) ; Ordering Provider:   Kevin Izaguirre MD; Catalog Code:   FLUoxetine ; Order Dt/Tm:   5/15/2012 5:30:07 PM ; Comment:   Refills called to [facility] in [place], [place]   [phone]--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          testosterone  :   testosterone ; Status:   Completed ; Ordered As Mnemonic:   testosterone 5 mg/24 hours transdermal film, extended release ; Simple Display Line:   24.3 mg, 1 patch(es), top, daily, 30 patch(es) ; Ordering Provider:   Kevin Izaguirre MD; Catalog Code:   testosterone ; Order Dt/Tm:   1/17/2012 7:01:42 PM            Home Meds    acetaminophen  :   acetaminophen ; Status:   Completed ; Ordered As Mnemonic:   Tylenol 500 mg oral tablet ; Simple Display Line:   1,000 mg, 2 tab(s), PO, hs ; Catalog Code:   acetaminophen ; Order Dt/Tm:   2/23/2012 12:09:40 PM          amphetamine-dextroamphetamine  :   amphetamine-dextroamphetamine ; Status:   Documented ; Ordered As Mnemonic:   Adderall 20 mg oral tablet ; Simple Display Line:   20 mg, 1 tab(s), Oral, bid, 0 Refill(s) ; Catalog Code:   amphetamine-dextroamphetamine ; Order Dt/Tm:   6/16/2019 4:30:00 PM          sildenafil  :   sildenafil ; Status:   Documented ; Ordered As Mnemonic:   sildenafil 20 mg oral tablet ; Simple Display Line:   20 mg, 1 tab(s), Oral, tid, 0 Refill(s) ; Catalog Code:   sildenafil ; Order Dt/Tm:   6/16/2019 4:30:39 PM

## 2022-02-15 NOTE — PROGRESS NOTES
Patient:   TOM MICHELE            MRN: [id number]            FIN: [id number]               Age:   40 years     Sex:  Male     :  1980   Associated Diagnoses:   Adult ADHD   Author:   Camden Castro MD      Visit Information      Date of Service: 2021 02:16 pm  Performing Location: [facility]  Encounter#: [id number]      Primary Care Provider (PCP):  Kevin Izaguirre MD    NPI# [id number]      Referring Provider:  Camden Castro MD    NPI# [id number]   Visit type:  Video Visit via [employer] or [employer].    Participants in room during visit:  _pt   Location of patient:  _home  Location of provider:  _ (Clinic office   Video Start Time:  _355  Video End Time:   _400    Today's visit was conducted via video conference due to the COVID-19 pandemic.  The patient's consent to proceed with a video visit has been obtained and documented.      Chief Complaint   2021 3:51 PM CDT    ADHD med check, refills; verbal consent given for video visit        History of Present Illness   Patient  is being evaluated via a billable video visit. Patient calls for refills on his Adderall.  He had a video visit here in February and April.  He previously had gotten Adderall from the psychiatrist.  He has been try to get a hold of his psychiatrist again for more refills and is unable to.         Health Status   Allergies:    Allergic Reactions (Selected)  No known allergies   Medications:  (Selected)   Prescriptions  Prescribed  Lexapro 10 mg oral tablet: = 1 tab(s) ( 10 mg ), Oral, daily, # 90 tab(s), 3 Refill(s), Type: Maintenance, [id number] [place], 1 tab(s) Oral daily, 72.75, in, 20 10:28:00 CDT, Height Measured, 223, lb, 20 10:28:00 CDT, Weight Measured  amphetamine-dextroamphetamine 15 mg oral tablet: = 1 tab(s) ( 15 mg ), Oral, daily, # 30 tab(s), 0 Refill(s), Type: Maintenance, [id number] [place], 1 tab(s) Oral daily, 72.75, in, 20 10:28:00 CDT,  Height Measured, 223, lb, 05/27/20 10:28:00 CDT, Weight Measured  amphetamine-dextroamphetamine 30 mg oral tablet: = 1 tab(s) ( 30 mg ), Oral, bid, # 60 tab(s), 0 Refill(s), Type: Maintenance, [id number] [place], 1 tab(s) Oral bid, 72.75, in, 05/27/20 10:28:00 CDT, Height Measured, 223, lb, 05/27/20 10:28:00 CDT, Weight Measured  amphetamine-dextroamphetamine 30 mg oral tablet: = 1 tab(s) ( 30 mg ), Oral, bid, Instructions: Fill in thirty days, # 60 tab(s), 0 Refill(s), Type: Maintenance, [id number], 1 tab(s) Oral bid,x30 day(s),Instr:Fill in thirty days, 72.75, in, 05/27/20 10:28:00 CDT, Height Measured,...  sildenafil 20 mg oral tablet: = 1 tab(s) ( 20 mg ), Oral, daily, # 30 tab(s), 1 Refill(s), Type: Maintenance, Pharmacy: [place] [id number] [facility], 1 tab(s) Oral daily, 72.75, in, 05/27/20 10:28:00 CDT, Height Measured, 223, lb, 05/27/20 10:28:00 CDT, Weight Measured  Documented Medications  Documented  amphetamine-dextroamphetamine 15 mg oral tablet: 0 Refill(s), Type: Soft Stop   Problem list:    All Problems  Sprain of Neck / ICD-9-.0 / Confirmed  Rotator Cuff Tendinitis / ICD-9-.10 / Confirmed  Cervical Pain / ICD-9-.1 / Confirmed  Degenerative Disc Disease / ICD-9-.6 / Confirmed  Cervical disc degeneration / ICD-9-.4 / Confirmed  Tobacco Use Disorder, Continuous / ICD-9-.1 / Confirmed  Obsessive-Compulsive Disorder / ICD-9-.3 / Confirmed  Adult ADHD / SNOMED CT 0944293072 / Confirmed  Obese / ICD-9-.00 / Probable  Tinea corporis / ICD-9-.5 / Confirmed      Histories   Past Medical History:    Active  Cervical Pain (723.1)  Cervical disc degeneration (722.4)  Sprain of Neck (847.0)  Tinea corporis (110.5)  Tobacco Use Disorder, Continuous (305.1)  Obsessive-Compulsive Disorder (300.3)  Degenerative Disc Disease (722.6)  Comments:  7/9/2010 CDT 12:35 PM CDT - Suly Estrada CMA  C4-5  Rotator Cuff Tendinitis (726.10)   Family  History:       Procedure history:    History of knee surgery (SNOMED CT 8095655943).  Comments:  9/17/2010 2:25 PM CDT - Estela Carey     Social History:        Electronic Cigarette/Vaping Assessment            Electronic Cigarette Use: Never.      Alcohol Assessment: Denies Alcohol Use      Tobacco Assessment: Current            chewing tobacco, Snuff, 10 year(s).      Substance Abuse Assessment: Denies Substance Abuse      Exercise and Physical Activity Assessment: Regular exercise        Physical Examination   General:  Alert and oriented, No acute distress.    Eye:  Pupils are equal, round and reactive to light, Normal conjunctiva.    HENT:  Oral mucosa is moist.    Neck:  Supple.    Respiratory:  Respirations are non-labored.    Psychiatric:  Cooperative, Appropriate mood & affect, Normal judgment.       Impression and Plan   Diagnosis     Adult ADHD (AEU30-GM F90.9).     Plan:  Patient with ADD since he was last seen here and had his prescription renewed he has had 2 more months prescriptions from his psychiatrist for the [place] prescription drug database.  We let him know that he needs to see a psychiatrist for more refills because of him jumping back and forth.  .

## 2022-02-15 NOTE — NURSING NOTE
Comprehensive Intake Entered On:  5/27/2020 10:33 AM CDT    Performed On:  5/27/2020 10:28 AM CDT by Lima COLON, Victoria               Summary   Chief Complaint :   c/o chest and back pain since Sunday, getting worse.  started out in chest, spread to back Monday.  hurts to breath, left side.  denies cough or fever.  does hurt to roll over [id number], feet feel numb.   Weight Measured :   223 lb(Converted to: 223 lb 0 oz, 101.15 kg)    Height Measured :   72.75 in(Converted to: 6 ft 1 in, 184.78 cm)    Body Mass Index :   29.62 kg/m2 (HI)    Body Surface Area :   2.28 m2   Systolic Blood Pressure :   140 mmHg   Diastolic Blood Pressure :   92 mmHg (HI)    Mean Arterial Pressure :   108 mmHg   Peripheral Pulse Rate :   99 bpm   BP Site :   Right arm   Pulse Site :   Radial artery   BP Method :   Manual   HR Method :   Manual   Temperature Tympanic :   98.1 DegF(Converted to: 36.7 DegC)    Oxygen Saturation :   96 %   Victoria Amato MA - 5/27/2020 10:28 AM CDT   Health Status   Allergies Verified? :   Yes   Medication History Verified? :   Yes   Immunizations Current :   Yes   Medical History Verified? :   Yes   Pre-Visit Planning Status :   Not completed   Tobacco Use? :   Current every day smoker   Victoria Amato MA - 5/27/2020 10:28 AM CDT   Consents   Consent for Immunization Exchange :   Consent Granted   Consent for Immunizations to Providers :   Consent Granted   Victoria Amato MA - 5/27/2020 10:28 AM CDT   Meds / Allergies   (As Of: 5/27/2020 10:33:26 AM CDT)   Allergies (Active)   No known allergies  Estimated Onset Date:   Unspecified ; Created By:   Suly Estrada CMA; Reaction Status:   Active ; Category:   Drug ; Substance:   No known allergies ; Type:   Allergy ; Updated By:   Suly Estrada CMA; Reviewed Date:   6/16/2019 4:29 PM CDT        Medication List   (As Of: 5/27/2020 10:33:26 AM CDT)   Home Meds    amphetamine-dextroamphetamine  :   amphetamine-dextroamphetamine ; Status:   Documented ; Ordered As  Mnemonic:   Adderall 20 mg oral tablet ; Simple Display Line:   20 mg, 1 tab(s), Oral, bid, 0 Refill(s) ; Catalog Code:   amphetamine-dextroamphetamine ; Order Dt/Tm:   6/16/2019 4:30:00 PM CDT          escitalopram  :   escitalopram ; Status:   Documented ; Ordered As Mnemonic:   escitalopram 10 mg oral tablet ; Simple Display Line:   0 Refill(s) ; Catalog Code:   escitalopram ; Order Dt/Tm:   5/27/2020 9:38:12 AM CDT ; Comment:   Responsible Provider: DICKSON BARBER          sildenafil  :   sildenafil ; Status:   Documented ; Ordered As Mnemonic:   sildenafil 20 mg oral tablet ; Simple Display Line:   20 mg, 1 tab(s), Oral, tid, 0 Refill(s) ; Catalog Code:   sildenafil ; Order Dt/Tm:   6/16/2019 4:30:39 PM CDT            ID Risk Screen   Recent Travel History :   No recent travel   Family Member Travel History :   No recent travel   Other Exposure to Infectious Disease :   Unknown   Lima COLON, Victoria - 5/27/2020 10:28 AM CDT

## 2022-02-15 NOTE — NURSING NOTE
Comprehensive Intake Entered On:  7/14/2021 3:53 PM CDT    Performed On:  7/14/2021 3:51 PM CDT by Svitlana Ramos CMA               Summary   Chief Complaint :   ADHD med check, refills; verbal consent given for video visit   Svitlana Ramos CMA - 7/14/2021 3:51 PM CDT   Health Status   Allergies Verified? :   No   Medication History Verified? :   Yes   Immunizations Current :   Yes   Medical History Verified? :   Yes   Svitlana Ramos CMA - 7/14/2021 3:51 PM CDT   Consents   Consent for Immunization Exchange :   Consent Granted   Consent for Immunizations to Providers :   Consent Granted   Svitlana Ramos CMA - 7/14/2021 3:51 PM CDT   Meds / Allergies   (As Of: 7/14/2021 3:53:02 PM CDT)   Allergies (Active)   No known allergies  Estimated Onset Date:   Unspecified ; Created By:   Suly Estrada CMA; Reaction Status:   Active ; Category:   Drug ; Substance:   No known allergies ; Type:   Allergy ; Updated By:   Suly Estrada CMA; Reviewed Date:   7/14/2021 3:52 PM CDT        Medication List   (As Of: 7/14/2021 3:53:02 PM CDT)   Prescription/Discharge Order    amphetamine-dextroamphetamine  :   amphetamine-dextroamphetamine ; Status:   Prescribed ; Ordered As Mnemonic:   amphetamine-dextroamphetamine 30 mg oral tablet ; Simple Display Line:   30 mg, 1 tab(s), Oral, bid, for 30 day(s), Fill in thirty days, 60 tab(s), 0 Refill(s) ; Ordering Provider:   Phillip Oliveros PA-C; Catalog Code:   amphetamine-dextroamphetamine ; Order Dt/Tm:   2/12/2021 1:37:02 PM CST ; Comment:   Responsible Provider: DICKSON BARBER          amphetamine-dextroamphetamine  :   amphetamine-dextroamphetamine ; Status:   Prescribed ; Ordered As Mnemonic:   amphetamine-dextroamphetamine 30 mg oral tablet ; Simple Display Line:   30 mg, 1 tab(s), Oral, bid, 60 tab(s), 0 Refill(s) ; Ordering Provider:   Kevin Izaguirre MD; Catalog Code:   amphetamine-dextroamphetamine ; Order Dt/Tm:   5/18/2021 9:08:39 AM CDT          sildenafil  :   sildenafil ;  Status:   Prescribed ; Ordered As Mnemonic:   sildenafil 20 mg oral tablet ; Simple Display Line:   20 mg, 1 tab(s), Oral, daily, 30 tab(s), 1 Refill(s) ; Ordering Provider:   Kevin Izaguirre MD; Catalog Code:   sildenafil ; Order Dt/Tm:   6/28/2021 1:33:05 PM CDT          amphetamine-dextroamphetamine  :   amphetamine-dextroamphetamine ; Status:   Prescribed ; Ordered As Mnemonic:   amphetamine-dextroamphetamine 15 mg oral tablet ; Simple Display Line:   15 mg, 1 tab(s), Oral, daily, 30 tab(s), 0 Refill(s) ; Ordering Provider:   Kevin Izaguirre MD; Catalog Code:   amphetamine-dextroamphetamine ; Order Dt/Tm:   5/18/2021 9:08:38 AM CDT          escitalopram  :   escitalopram ; Status:   Prescribed ; Ordered As Mnemonic:   Lexapro 10 mg oral tablet ; Simple Display Line:   10 mg, 1 tab(s), Oral, daily, 90 tab(s), 3 Refill(s) ; Ordering Provider:   Phillip Oliveros PA-C; Catalog Code:   escitalopram ; Order Dt/Tm:   2/12/2021 1:35:28 PM CST            Home Meds    amphetamine-dextroamphetamine  :   amphetamine-dextroamphetamine ; Status:   Documented ; Ordered As Mnemonic:   amphetamine-dextroamphetamine 15 mg oral tablet ; Simple Display Line:   0 Refill(s) ; Catalog Code:   amphetamine-dextroamphetamine ; Order Dt/Tm:   2/12/2021 1:27:55 PM CST ; Comment:   Responsible Provider: DICKSON BARBER

## 2022-02-15 NOTE — NURSING NOTE
Comprehensive Intake Entered On:  2/12/2021 1:27 PM CST    Performed On:  2/12/2021 1:25 PM CST by Valentine Wallace CMA               Summary   Chief Complaint :   Verbal consent given for video visit. Provider who normally fills meds is unreachable. Needing Adderall 30mg and sildenafil 20mg refilled.    Valentine Wallace CMA - 2/12/2021 1:25 PM CST   Health Status   Allergies Verified? :   Yes   Medication History Verified? :   Yes   Immunizations Current :   Yes   Pre-Visit Planning Status :   Not completed   Tobacco Use? :   Current every day smoker   Valentine Wallace CMA - 2/12/2021 1:25 PM CST   Meds / Allergies   (As Of: 2/12/2021 1:27:34 PM CST)   Allergies (Active)   No known allergies  Estimated Onset Date:   Unspecified ; Created By:   Suly Estrada CMA; Reaction Status:   Active ; Category:   Drug ; Substance:   No known allergies ; Type:   Allergy ; Updated By:   Suly Estrada CMA; Reviewed Date:   6/16/2019 4:29 PM CDT        Medication List   (As Of: 2/12/2021 1:27:34 PM CST)   Home Meds    sildenafil  :   sildenafil ; Status:   Documented ; Ordered As Mnemonic:   sildenafil 20 mg oral tablet ; Simple Display Line:   20 mg, 1 tab(s), Oral, daily, 0 Refill(s) ; Catalog Code:   sildenafil ; Order Dt/Tm:   6/16/2019 4:30:39 PM CDT          amphetamine-dextroamphetamine  :   amphetamine-dextroamphetamine ; Status:   Documented ; Ordered As Mnemonic:   Adderall 30 mg oral tablet ; Simple Display Line:   30 mg, 1 tab(s), Oral, bid, 60 tab(s), 0 Refill(s) ; Catalog Code:   amphetamine-dextroamphetamine ; Order Dt/Tm:   2/12/2021 1:26:51 PM CST          amphetamine-dextroamphetamine  :   amphetamine-dextroamphetamine ; Status:   Documented ; Ordered As Mnemonic:   Adderall 15 mg oral tablet ; Simple Display Line:   15 mg, 1 tab(s), Oral, daily, 0 Refill(s) ; Catalog Code:   amphetamine-dextroamphetamine ; Order Dt/Tm:   2/12/2021 1:27:01 PM CST            ID Risk Screen   Recent Travel History :   No recent travel    Family Member Travel History :   No recent travel   Other Exposure to Infectious Disease :   Unknown   COVID-19 Testing Status :   No COVID-19 test performed   Valentine Wallace CMA - 2/12/2021 1:25 PM CST

## 2022-02-15 NOTE — TELEPHONE ENCOUNTER
---------------------  From: Nya Ragsdale CMA   To: [facility] ([id number]_[place] [facility] [place]);     Sent: 4/13/2021 2:42:46 PM CDT  Subject: PA-Adderall 15mg     PA attempted to be completed via CMM but was instructed to call [phone]. I have done this and they will be faxing paperwork for completion.---------------------  From: Victoria Amato MA ([facility] ([id number]_[facility]))   To: Kevin Izaguirre MD;     Sent: 4/13/2021 2:51:18 PM CDT  Subject: FW: PA-Adderall 15mg     Will route ppwk to your box when received.---------------------  From: Kevin Izaguirre MD   To: [facility] ([id number]_[place] [facility] [place]);     Sent: 4/14/2021 12:57:40 PM CDT  Subject: RE: PA-Adderall 15mg     CompletedPA forms faxed to [phone]

## 2022-02-15 NOTE — TELEPHONE ENCOUNTER
---------------------  From: Valentine Wallace CMA (Phone Messages Pool ([address] [place] [address]))   To: Advanced Practice Provider Pool ([address] [place] [address]); [facility] Message Pool ([address] [place] [address]);     Sent: 5/14/2021 2:58:42 PM CDT  Subject: refill      [facility] OOC until 5/17    Pt calling at 1447. Would like refill of sildenafil 20mg to be sent to covering provider since he would like for the weekend.     [facility] has never filled this in past. Pt requesting #60 to [facility]. I told him the quantity would be up to the provider approving this request and he understood.    Adderall refill request okay to wait for [facility].     Medication Refill needing approval    PCP:   MARIBELL     Medication:   sildenafil 20mg  Last Filled:  only documented     Medication:   Adderall 15mg  Last Filled:  4/12/21    Quantity:  30  Refills:  0  CSA on file?   none     Date of last office visit and reason:   4/12/21 telemed ADD f/u [facility]  Date of last labs pertaining to condition:  none    Return to Clinic order placed?  per last visit note, f/u in 4 months    Resource:   pt  Phone:   [phone], call back requested---------------------  From: Jones MCKEON, Keyon CLAY (Advanced Practice Provider Pool ([address] [place] [address]))   To: [facility] Message Pool ([address] [place] [address]); Phone Messages Pool ([address] [place] [address]);     Sent: 5/14/2021 3:15:38 PM CDT  Subject: RE: refill      Rx filled, #30, no refillsPt notified and understands Adderall will be addressed next week.---------------------  From: Victoria Amato MA ([facility] Message Pool ([address] [place] [address]))   To: Kevin Izaguirre MD;     Sent: 5/17/2021 9:11:22 AM CDT  Subject: FW: refill  ** Submitted: **  Order:amphetamine-dextroamphetamine (amphetamine-dextroamphetamine 30 mg oral tablet)  1 tab(s)  Oral  bid  Qty:  60 tab(s)        Refills:  0          Substitutions Allowed     [id number]    Signed by Kevin Izaguirre MD  5/18/2021 2:08:00 PM [facility]    ** Submitted:  **  Order:amphetamine-dextroamphetamine (amphetamine-dextroamphetamine 15 mg oral tablet)  1 tab(s)  Oral  daily  Qty:  30 tab(s)        Refills:  0          Substitutions Allowed     [id number]    Signed by Kevin Izaguirre MD  5/18/2021 2:08:00 PM [facility]---------------------  From: Kevin Izaguirre MD   To: [facility] Pool ([id number]_[place] [facility] [place]);     Sent: 5/18/2021 9:10:54 AM CDT  Subject: RE: refillLM for pt re: rx refill sent in by [facility].

## 2022-02-15 NOTE — TELEPHONE ENCOUNTER
---------------------  From: Ferny MÁRQUEZ, Jennifer CARDENAS (Phone Messages Pool ([id number]_[facility]))   To: TOM MICHELE    Sent: 4/9/2021 3:40:42 PM CDT  Subject: FW: Refill     Hi Tom,    Dr. Izaguirre wants to see you before refilling your medications.     Thanks,  Jennifer LAUREN LPN        ---------------------  From: TOM MICHELE  To: [facility]  Sent: 04/09/2021 03:21 p.m. CDT  Subject: Refill  Just messaging in for my Adderall refill.    Thank you.

## 2022-02-15 NOTE — TELEPHONE ENCOUNTER
---------------------  From: Patito De RN (Phone Messages Pool ([id number]_[facility]))   To: TOM MICHELE    Sent: 2/12/2021 11:43:06 AM CST  Subject: RE: Help with refill     Hi Tom -     Dr. Izaguirre is out of clinic until Monday morning. Could we get you set up with a video visit with a provider that is in clinic today to get your refills sent in? This would be the best option for refills as these medications had not been prescribed by Dr. Izaguirre.    We have several providers in clinic today that would be happy to see you for an appointment. You can arrange that by either responding to this message or calling the clinic at [phone] to schedule.    Thanks,  Patito HARRELL RN        ---------------------  From: TOM MICHELE  To: [facility]  Sent: 02/12/2021 11:32 a.m. CST  Subject: Help with refill  Hel.    I m trying to get some help here. I have been seeing Dr. Russell at [facility] for a while but was previously with here. I messaged to get my refills with no response from them so called today to find out they are no longer there! I was never informed and am confused and have not been able to get a hold of him. Is it possible to see someone today to get my refills that are needed? I ve seen Dr. Fitzpatrick but would see anyone for now. Thank you [phone] is also my cell.

## 2022-02-15 NOTE — TELEPHONE ENCOUNTER
---------------------  From: Donna/Daniela COLON (Phone Messages Pool ([address] [place] [address]))   To: [facility] Message Pool ([address] [place] [address]);     Sent: 8/2/2021 2:52:20 PM CDT  Subject: General Message     Phone Message    PCP: MARIBELL    Time of Call: 1427    Phone Number: [phone] ext [phone] ID # [id number]    Returned call at: n/a    Note: Call from Rhonda at [facility]. The are calling about the PA for sildenafil 20mg. They are needing to know the underlying cause for pt's ED.    Please advise---------------------  From: Victoria Amato MA ([facility] Message Pool ([facility]))   To: Kevin Izaguirre MD;     Sent: 8/2/2021 3:02:42 PM CDT  Subject: FW: General Message---------------------  From: Kevin Izaguirre MD   To: [facility] Message Pool ([id number]);     Sent: 8/2/2021 3:12:20 PM CDT  Subject: RE: General Message     It is a medication side effect.CSS returned call and spoke w/ Rhonda.

## 2022-02-15 NOTE — TELEPHONE ENCOUNTER
---------------------  From: Patito De RN (Phone Messages Pool ([id number]))   To: Appointment Pool ([id number]_[place]);     Sent: 2/12/2021 11:54:04 AM CST  Subject: FW: Help with refill     Please contact patient to set up video visit today for medication refills.       ---------------------  From: TOM MICHELE  To: [facility]  Sent: 02/12/2021 11:52 a.m. CST  Subject: RE: Help with refill  Thank you! I would be willing to video with anyone today if you could set that up.  ---------------------  From: Patito De RN (Phone Messages Pool ([id number]_[facility]))  To: TOM MICHELE  Sent: 2/12/2021 11:43:06 AM CST  Subject: RE: Help with refill       Hi Tom -       Dr. Izaguirre is out of clinic until Monday morning. Could we get you set up with a video visit with a provider that is in clinic today to get your refills sent in? This would be the best option for refills as these medications had not been prescribed by Dr. Izaguirre.       We have several providers in clinic today that would be happy to see you for an appointment. You can arrange that by either responding to this message or calling the clinic at [phone] to schedule.       Thanks,  Patito HARRELL RN                 ---------------------  From: TOM MICHELE  To: [facility]  Sent: 02/12/2021 11:32 a.m. CST  Subject: Help with refill  Hello.  I m trying to get some help here. I have been seeing Dr. Russell at [facility] for a while but was previously with here. I messaged to get my refills with no response from them so called today to find out they are no longer there! I was never informed and am confused and have not been able to get a hold of him. Is it possible to see someone today to get my refills that are needed? I ve seen Dr. Fitzpatrick but would see anyone for now. Thank you [phone] is also my cell.patient is scheduled

## 2022-02-15 NOTE — PROGRESS NOTES
Chief Complaint    verbal consent given for video visit. Medication check and refills.   Visit type:  Video visit via [employer] or [employer]   Participants in room during visit:  ab   Location of patient:  work   Location of physician:  office   Video start time:  0910   Video end time:  0918   Today's visit was conducted by video conference due to the COVID-19 pandemic.  The patient's consent to proceed with the video conference was obtained and documented.  History of Present Illness      Patent presents for attention deficit disorder follow up.  There have been no problems with the medication. The current dose is controlling symptoms. Reports occasional problems with depression.  Review of Systems          ROS reviewed and negative except for symptoms noted in HPI.  Physical Exam      Appears well, NAD  Assessment/Plan       1. Adult ADHD (F90.9)         Doing well, continue same dose        refill for 2 months        follow up in 4 months        PDMP queried, no concerns        Monitor depressive symptoms, follow up if worsening       Orders:         amphetamine-dextroamphetamine, = 1 tab(s) ( 30 mg ), Oral, bid, # 60 tab(s), 0 Refill(s), Type: Maintenance, [id number], 1 tab(s) Oral bid, 72.75, in, 05/27/20 10:28:00 CDT, Height Measured, 223, lb, 05/27/20 10:28:00 CDT, Weight Measured, (Ordered)         amphetamine-dextroamphetamine, = 1 tab(s) ( 15 mg ), Oral, daily, # 30 tab(s), 0 Refill(s), Type: Maintenance, [id number], 1 tab(s) Oral daily, 72.75, in, 05/27/20 10:28:00 CDT, Height Measured, 223, lb, 05/27/20 10:28:00 CDT, Weight Measured, (Ordered)         amphetamine-dextroamphetamine, = 1 tab(s) ( 30 mg ), Oral, bid, # 60 tab(s), 0 Refill(s), Type: Hard Stop, [id number], 72.75, in, 05/27/20 10:28:00 CDT, Height Measured, 223, lb, 05/27/20 10:28:00 CDT, Weight Measured, (Completed)         amphetamine-dextroamphetamine, = 1 tab(s) ( 15 mg ),  Oral, daily, # 30 tab(s), 0 Refill(s), Type: Hard Stop, [id number], 72.75, in, 05/27/20 10:28:00 CDT, Height Measured, 223, lb, 05/27/20 10:28:00 CDT, Weight Measured, (Completed)         amphetamine-dextroamphetamine, = 1 tab(s) ( 30 mg ), Oral, bid, # 60 tab(s), 0 Refill(s), Type: Hard Stop, Pharmacy: [facility] [id number] [facility], 72.75, in, 05/27/20 10:28:00 CDT, Height Measured, 223, lb, 05/27/20 10:28:00 CDT, Weight Measured, (Completed)  Patient Information     Name:TOM MICHELE      Address:      [address] [place] [address] [place], [place] [id number]     Sex:Male     YOB: 1980     [phone]     Emergency Contact:[facility], CONTACT     MRN:[id number]     FIN:[id number]     Location:[facility]     Date of Service:04/12/2021      Primary Care Physician:       Kevin Izaguirre MD, [phone]      Attending Physician:       Kevin Izaguirre MD, [phone]  Problem List/Past Medical History    Ongoing     Adult ADHD     Cervical disc degeneration     Cervical Pain     Degenerative Disc Disease       Comments: C4-5     Obese     Obsessive-Compulsive Disorder     Rotator Cuff Tendinitis     Sprain of Neck     Tinea corporis     Tobacco Use Disorder, Continuous    Historical     No qualifying data  Procedure/Surgical History     History of knee surgery            Comments: right.  Medications    amphetamine-dextroamphetamine 15 mg oral tablet, 15 mg= 1 tab(s), Oral, daily    amphetamine-dextroamphetamine 15 mg oral tablet    amphetamine-dextroamphetamine 30 mg oral tablet, 30 mg= 1 tab(s), Oral, bid    amphetamine-dextroamphetamine 30 mg oral tablet, 30 mg= 1 tab(s), Oral, bid    Lexapro 10 mg oral tablet, 10 mg= 1 tab(s), Oral, daily, 3 refills    sildenafil 20 mg oral tablet, 20 mg= 1 tab(s), Oral, daily  Allergies    No known allergies  Social History    Smoking Status     Current every day smoker     Alcohol - Denies Alcohol Use     Electronic Cigarette/Vaping       Electronic Cigarette Use: Never.     Exercise - Regular exercise     Substance Abuse - Denies Substance Abuse     Tobacco - Current      chewing tobacco, Snuff, 10 year(s).  Family History    Mother: History is negative    Father: History is negative    Sister: History is negative  Immunizations      Vaccine Date Status          tetanus/diphth/pertuss (Tdap) adult/adol 07/01/2011 Recorded          Td 10/20/2006 Recorded          typhoid, inactivated 06/22/1992 Recorded          MMR (measles/mumps/rubella) 06/08/1991 Recorded

## 2022-02-15 NOTE — NURSING NOTE
Comprehensive Intake Entered On:  11/17/2021 10:03 AM CST    Performed On:  11/17/2021 10:00 AM CST by Lima COLON, Victoria               Summary   Chief Complaint :   verbal consent given for video visit.  ADHD f/u, refill Adderall--[facility].   Victoria Amato MA - 11/17/2021 10:00 AM CST   Health Status   Allergies Verified? :   Yes   Medication History Verified? :   Yes   Immunizations Current :   Yes   Medical History Verified? :   Yes   Pre-Visit Planning Status :   Completed   Victoria Amato MA - 11/17/2021 10:00 AM CST   Consents   Consent for Immunization Exchange :   Consent Granted   Consent for Immunizations to Providers :   Consent Granted   Victoria Amato MA - 11/17/2021 10:00 AM CST   Meds / Allergies   (As Of: 11/17/2021 10:03:43 AM CST)   Allergies (Active)   No known allergies  Estimated Onset Date:   Unspecified ; Created By:   Suly Estrada CMA; Reaction Status:   Active ; Category:   Drug ; Substance:   No known allergies ; Type:   Allergy ; Updated By:   Suly Estrada CMA; Reviewed Date:   7/14/2021 3:52 PM CDT        Medication List   (As Of: 11/17/2021 10:03:43 AM CST)   Prescription/Discharge Order    amphetamine-dextroamphetamine  :   amphetamine-dextroamphetamine ; Status:   Prescribed ; Ordered As Mnemonic:   amphetamine-dextroamphetamine 15 mg oral tablet ; Simple Display Line:   15 mg, 1 tab(s), Oral, daily, 30 tab(s), 0 Refill(s) ; Ordering Provider:   Kevin Izaguirre MD; Catalog Code:   amphetamine-dextroamphetamine ; Order Dt/Tm:   5/18/2021 9:08:38 AM CDT          amphetamine-dextroamphetamine  :   amphetamine-dextroamphetamine ; Status:   Prescribed ; Ordered As Mnemonic:   amphetamine-dextroamphetamine 30 mg oral tablet ; Simple Display Line:   30 mg, 1 tab(s), Oral, bid, 60 tab(s), 0 Refill(s) ; Ordering Provider:   Kevin Izaguirre MD; Catalog Code:   amphetamine-dextroamphetamine ; Order Dt/Tm:   5/18/2021 9:08:39 AM CDT          amphetamine-dextroamphetamine   :   amphetamine-dextroamphetamine ; Status:   Completed ; Ordered As Mnemonic:   amphetamine-dextroamphetamine 30 mg oral tablet ; Simple Display Line:   30 mg, 1 tab(s), Oral, bid, for 30 day(s), Fill in thirty days, 60 tab(s), 0 Refill(s) ; Ordering Provider:   Phillip Oliveros PA-C; Catalog Code:   amphetamine-dextroamphetamine ; Order Dt/Tm:   2/12/2021 1:37:02 PM CST ; Comment:   Responsible Provider: DICKSON BARBER          escitalopram  :   escitalopram ; Status:   Prescribed ; Ordered As Mnemonic:   Lexapro 10 mg oral tablet ; Simple Display Line:   10 mg, 1 tab(s), Oral, daily, 90 tab(s), 3 Refill(s) ; Ordering Provider:   Phillip Oliveros PA-C; Catalog Code:   escitalopram ; Order Dt/Tm:   2/12/2021 1:35:28 PM CST          sildenafil  :   sildenafil ; Status:   Prescribed ; Ordered As Mnemonic:   sildenafil 20 mg oral tablet ; Simple Display Line:   1 tab(s), Oral, daily, 30 tab(s), 1 Refill(s) ; Ordering Provider:   Kevin Izaguirre MD; Catalog Code:   sildenafil ; Order Dt/Tm:   9/14/2021 10:46:24 AM CDT            Home Meds    amphetamine-dextroamphetamine  :   amphetamine-dextroamphetamine ; Status:   Completed ; Ordered As Mnemonic:   amphetamine-dextroamphetamine 15 mg oral tablet ; Simple Display Line:   0 Refill(s) ; Catalog Code:   amphetamine-dextroamphetamine ; Order Dt/Tm:   2/12/2021 1:27:55 PM CST ; Comment:   Responsible Provider: DICKSON BARBER            Social History   Social History   (As Of: 11/17/2021 10:03:43 AM CST)   Alcohol:  Denies Alcohol Use      (Last Updated: 3/9/2011 8:15:17 AM CST by Victoria Amato MA )         Tobacco:  Current      chewing tobacco, Snuff, 10 year(s).   (Last Updated: 2/12/2021 1:25:13 PM CST by Valentine Wallace CMA)          Electronic Cigarette/Vaping:        Electronic Cigarette Use: Never.   (Last Updated: 2/12/2021 1:23:50 PM CST by Valentine Wallace CMA)          Substance Abuse:  Denies Substance Abuse      (Last Updated: 3/9/2011  8:15:19 AM CST by Victoria Amato MA )         Exercise:  Regular exercise      (Last Updated: 3/9/2011 8:15:27 AM CST by Victoria Amato MA )

## 2022-02-15 NOTE — TELEPHONE ENCOUNTER
---------------------  From: Sheila Smith RN   Sent: 11/16/2021 4:00:30 PM CST  Subject: Adderall refill     Pt calling requesting a refill of adderall.  Pt states he has been taking a 30 mg in the morning, 30 mg around lunch and a 15 mg later in the day as needed.  Pt states that he was seeing a psychiatrist but they are no longer practicing in the [facility] so  he is looking for someone new.  Informed pt that he would need to have a visit in order for the prescriptions to be refills.  Pt stated understanding and was transferred to scheduling to make a follow up appointment.

## 2022-02-15 NOTE — PROGRESS NOTES
Chief Complaint    did have blood in urine, foul smelling urine since Tues but has since improved, RLQ pain yesterday/lower back pain, constipation since Friday  History of Present Illness      Chief complaint as above reviewed and confirmed with patient.  Pt presents to the clinic with concerns re: abdominal/back pain.  He states 5 days ago he developed hematuria, with some blood clots and pink urine, dysuria.  It improved with use of his wife's cipro x 3 days and azo.  He woke yesterday with RLQ abdominal pain that was moderate.  He did not have a BM and had been drinking the night before thus felt he may be constipated.  Ate well through the day, no nausea/vomiting. no fevers.  Today woke with RLQ better but it has moved to more central low abdomen.  He has low flow urine, mild end stream dysuria but quite minimal and back ache R greater than L. LBM 2 days ago.  No blood per rectum.  Review of Systems      Review of systems is negative with the exception of those noted in HPI          Physical Exam   Vitals & Measurements    T: 98.7   F (Tympanic)  HR: 82(Peripheral)  BP: 140/90     HT: 72.75 in  WT: 229.6 lb  BMI: 30.5           Vitals as above per nursing documentation           Constitutional : nad appears well          Ears: ears patent B, TMS intact, noninjected           Nose: nasal mucosa is non-ededmatous. no discharge           Throat: pharynx is nonerythematous, no tonsillar hypertrophy, no exudate           Neck: neck supple, no adenopathy, no thyromegaly, no rigidity           Lungs: lungs CTA', no Wheezes, rhonchi or rales           Heart: heart RRR, nl S1, S2 no murmur           skin:  No rashes            Abdomen: BS active, soft, tender to palpation suprapubic and RLQ, no rebound or peritoneal signs. no masses or hsm.  mild CVAT.       MM moist, skin turger good.       CT abdomen and pelvis: no acute process, no hydronephrosis or stone, normal appendix.  contracted gallbladder but otherwise normal         labs reviewed with pt.       slight elevated WBC with L shift,      UA with pyuria and hematuria, culture pending          Assessment/Plan       Hematuria (R31.9)         with pyruia a swell. cover for uti with abx await results. Push fluids, rest and ibuprofen or tylenol for comfort.  Consider that pt has had a small stone that has passed into the bladder.   will fu with culture results, if worsneing should fu sooner.         Ordered:          Basic Metabolic Panel (Request), Priority: Urgent, Right lower quadrant abdominal pain  Right flank pain  Hematuria          CBC w/ Diff/Plt (Request), Priority: Urgent, Right lower quadrant abdominal pain  Right flank pain  Hematuria          CT Abdomen and Pelvis w/contrast (Request), Priority: STAT, Instructions: IV & ORAL CONTRAST, Right lower quadrant abdominal pain  Right flank pain  Hematuria                Right flank pain (R10.9)         as above         Ordered:          Basic Metabolic Panel (Request), Priority: Urgent, Right lower quadrant abdominal pain  Right flank pain  Hematuria          CBC w/ Diff/Plt (Request), Priority: Urgent, Right lower quadrant abdominal pain  Right flank pain  Hematuria          CT Abdomen and Pelvis w/contrast (Request), Priority: STAT, Instructions: IV & ORAL CONTRAST, Right lower quadrant abdominal pain  Right flank pain  Hematuria                Right lower quadrant abdominal pain (R10.31)         as above         Ordered:          Basic Metabolic Panel (Request), Priority: Urgent, Right lower quadrant abdominal pain  Right flank pain  Hematuria          CBC w/ Diff/Plt (Request), Priority: Urgent, Right lower quadrant abdominal pain  Right flank pain  Hematuria          CT Abdomen and Pelvis w/contrast (Request), Priority: STAT, Instructions: IV & ORAL CONTRAST, Right lower quadrant abdominal pain  Right flank pain  Hematuria                Orders:         sulfamethoxazole-trimethoprim, 1 tab(s), Oral,  bid, x 10 day(s), # 20 tab(s), 0 Refill(s), Type: Acute, Pharmacy: [id number], 1 tab(s) Oral bid,x10 day(s), (Ordered)         Culture, Urine, Routine* ([facility]), Specimen Type: Urine (Clean Catch), Collection Date: 06/16/19 16:50:00 CDT         POC URINALYSIS, UA* ([facility]), Specimen Type: Urine, Collection Date: 06/16/19 16:50:00 CDT         Review Orders for Potential Authorizations, 06/16/19 16:52:48 CDT  Patient Information     Name:TOM MICHELE      Address:      [address] [place] [address] [place] [address]     Sex:Male     YOB: 1980     [phone]     MRN:[id number]     FIN:[id number]     Location:[facility]     Date of Service:06/16/2019      Primary Care Physician:       Kevin Izaguirre MD, [phone]      Attending Physician:       Kecia Corley PA-C, [phone]  Problem List/Past Medical History    Ongoing     Cervical disc degeneration     Cervical Pain     Degenerative Disc Disease       Comments: C4-5     Obese     Obsessive-Compulsive Disorder     Rotator Cuff Tendinitis     Sprain of Neck     Tinea corporis     Tobacco Use Disorder, Continuous    Historical     No qualifying data  Procedure/Surgical History     History of knee surgery            Comments: right.  Medications     Adderall 20 mg oral tablet: 20 mg, 1 tab(s), Oral, bid, 0 Refill(s).     sildenafil 20 mg oral tablet: 20 mg, 1 tab(s), Oral, tid, 0 Refill(s).     Bactrim  mg-160 mg oral tablet: 1 tab(s), Oral, bid, for 10 day(s), 20 tab(s), 0 Refill(s).      Allergies    No known allergies  Social History    Smoking Status - 06/16/2019     Unknown if ever smoked     Alcohol - Denies Alcohol Use, 03/09/2011     Exercise - Regular exercise, 03/09/2011     Substance Abuse - Denies Substance Abuse, 03/09/2011     Tobacco - Current, 03/09/2011      Snuff, 10 year(s)., 03/09/2011      Current, 03/08/2011  Family History    Mother: History is negative    Father: History is  negative    Sister: History is negative  Immunizations      Vaccine Date Status      Td 10/20/2006 Recorded      typhoid, inactivated 06/22/1992 Recorded      MMR (measles/mumps/rubella) 06/08/1991 Recorded  Lab Results       Lab Results (Last 4 results within 90 days)        Sodium Level: 139 [135 mmol/L - 145 mmol/L] (06/16/19 17:06:00)       Potassium Level: 3.8 [3.5 mmol/L - 5 mmol/L] (06/16/19 17:06:00)       Chloride Level: 100 [98 mmol/L - 110 mmol/L] (06/16/19 17:06:00)       CO2 Level: 27 [21 mmol/L - 31 mmol/L] (06/16/19 17:06:00)       AGAP: 12 [5  - 18] (06/16/19 17:06:00)       Glucose Level: 99 [65 mg/dL - 100 mg/dL] (06/16/19 17:06:00)       BUN: 9 [8 mg/dL - 25 mg/dL] (06/16/19 17:06:00)       Creatinine Level: 1.08 [0.72 mg/dL - 1.25 mg/dL] (06/16/19 17:06:00)       BUN/Creat Ratio: 8 Low [10  - 20] (06/16/19 17:06:00)       eGFR : >60 (06/16/19 17:06:00)       eGFR Non-: >60 (06/16/19 17:06:00)       Calcium Level: 9.5 [8.5 mg/dL - 10.5 mg/dL] (06/16/19 17:06:00)       WBC: 13.6 High [4.5  - 11] (06/16/19 17:06:00)       RBC: 5.47 [4.3  - 5.9] (06/16/19 17:06:00)       Hgb: 16.9 [13.5 g/dL - 17.5 g/dL] (06/16/19 17:06:00)       Hct: 48.6 [37 % - 53 %] (06/16/19 17:06:00)       MCV: 89 [80 fL - 100 fL] (06/16/19 17:06:00)       MCH: 30.9 [26 pg - 34 pg] (06/16/19 17:06:00)       MCHC: 34.8 [32 g/dL - 36 g/dL] (06/16/19 17:06:00)       RDW: 16.8 High [11.5 % - 15.5 %] (06/16/19 17:06:00)       Platelet: 393 [140  - 440] (06/16/19 17:06:00)       MPV: 9.2 [6.5 fL - 11 fL] (06/16/19 17:06:00)       Lymphocytes: 12.1 (06/16/19 17:06:00)       Abs Lymphocytes: 1.7 [0.9  - 2.9] (06/16/19 17:06:00)       Neutrophils: 76.9 (06/16/19 17:06:00)       Abs Neutrophils: 10.5 High [1.7  - 7] (06/16/19 17:06:00)       Monocytes: 9.5 (06/16/19 17:06:00)       Abs Monocytes: 1.3 High (06/16/19 17:06:00)       Eosinophils: 1.0 (06/16/19 17:06:00)       Abs Eosinophils: 0.1 (06/16/19  17:06:00)       Basophils: 0.5 (06/16/19 17:06:00)       Abs Basophils: 0.1 (06/16/19 17:06:00)       UA pH: 7.5 [5  - 8] (06/16/19 16:57:00)       UA Specific Gravity: 1.015 [1.001  - 1.035] (06/16/19 16:57:00)       UA Glucose: NEGATIVE [NEGATIVE  - NEGATIVE] (06/16/19 16:57:00)       UA Bilirubin: NEGATIVE [NEGATIVE  - NEGATIVE] (06/16/19 16:57:00)       UA Ketones: NEGATIVE [NEGATIVE  - NEGATIVE] (06/16/19 16:57:00)       Urine Occult Blood: 2+ Abnormal [NEGATIVE  - NEGATIVE] (06/16/19 16:57:00)       UA Protein: 1+ Abnormal [NEGATIVE  - NEGATIVE] (06/16/19 16:57:00)       UA Nitrite: NEGATIVE [NEGATIVE  - NEGATIVE] (06/16/19 16:57:00)       UA Leukocyte Esterase: TRACE Abnormal [NEGATIVE  - NEGATIVE] (06/16/19 16:57:00)       UA WBC Clumps: Present (06/16/19 17:15:00)       UA Epithelial Cells: Few [None  - Few] (06/16/19 17:15:00)       UA WBC: 6-10 [None  - 5] (06/16/19 17:15:00)       UA RBC: 6-10 [None  - 2] (06/16/19 17:15:00)       UA Bacteria: Many [None  - Few] (06/16/19 17:15:00)

## 2022-02-15 NOTE — PROGRESS NOTES
Chief Complaint    verbal consent given for video visit.  ADHD f/u, refill Adderall--uses [facility].   Visit type:  Video visit via [employer] or [employer]   Participants in room during visit:  patient   Location of patient:  at work in [place]   Location of physician:  office   Video start time:  1058   Video end time:  1103   Today's visit was conducted by video conference due to the COVID-19 pandemic.  The patient's consent to proceed with the video conference was obtained and documented.  History of Present Illness      Patent presents for attention deficit disorder follow up.  There have been no problems with the medication. The current dose is controlling symptoms. There are no other concerns.  Review of Systems          ROS reviewed and negative except for symptoms noted in HPI.  Physical Exam      Appears well, NAD  Assessment/Plan       1. Adult ADHD (F90.9)         Doing well, continue same dose        refill for 2 months        follow up in 4 months        PDMP queried, no concerns       Orders:         amphetamine-dextroamphetamine, = 1 tab(s) ( 15 mg ), Oral, daily, # 30 tab(s), 0 Refill(s), Type: Hard Stop, Pharmacy: [id number], 72.75, in, 05/27/20 10:28:00 CDT, Height Measured, 223, lb, 05/27/20 10:28:00 CDT, Weight Measured, (Completed)         amphetamine-dextroamphetamine, = 1 tab(s) ( 30 mg ), Oral, bid, # 60 tab(s), 0 Refill(s), Type: Maintenance, Pharmacy: [facility] #[id number], 1 tab(s) Oral bid, 72.75, in, 05/27/20 10:28:00 CDT, Height Measured, 223, lb, 05/27/20 10:28:00 CDT, Weight Measured, (Ordered)         amphetamine-dextroamphetamine, = 1 tab(s) ( 30 mg ), Oral, bid, # 60 tab(s), 0 Refill(s), Type: Hard Stop, Pharmacy: [facility] #[id number], 72.75, in, 05/27/20 10:28:00 CDT, Height Measured, 223, lb, 05/27/20 10:28:00 CDT, Weight Measured, (Completed)         amphetamine-dextroamphetamine, = 1 tab(s) ( 15 mg ), Oral, daily, # 30 tab(s), 0 Refill(s),  Type: Hard Stop, Pharmacy: [facility] #[id number], 72.75, in, 05/27/20 10:28:00 CDT, Height Measured, 223, lb, 05/27/20 10:28:00 CDT, Weight Measured, (Completed)         amphetamine-dextroamphetamine, = 1 tab(s) ( 30 mg ), Oral, bid, # 60 tab(s), 0 Refill(s), Type: Hard Stop, Pharmacy: [id number], 72.75, in, 05/27/20 10:28:00 CDT, Height Measured, 223, lb, 05/27/20 10:28:00 CDT, Weight Measured, (Completed)         amphetamine-dextroamphetamine, = 1 tab(s) ( 15 mg ), Oral, daily, # 30 tab(s), 0 Refill(s), Type: Maintenance, Pharmacy: [facility] #[id number], 1 tab(s) Oral daily, 72.75, in, 05/27/20 10:28:00 CDT, Height Measured, 223, lb, 05/27/20 10:28:00 CDT, Weight Measured, (Ordered)  Patient Information     Name:TOM MICHELE      Address:      [address] [place] [address] [place], [place] [id number]     Sex:Male     YOB: 1980     [phone]     Emergency Contact:[facility], CONTACT     MRN:[id number]     FIN:[id number]     Location:[facility]     Date of Service:11/17/2021      Primary Care Physician:       Kevin Izaguirre MD, [phone]      Attending Physician:       Kevin Izaguirre MD, [phone]  Problem List/Past Medical History    Ongoing     Adult ADHD     Cervical disc degeneration     Cervical Pain     Degenerative Disc Disease       Comments: C4-5     Obese     Obsessive-Compulsive Disorder     Rotator Cuff Tendinitis     Sprain of Neck     Tinea corporis     Tobacco Use Disorder, Continuous    Historical     No qualifying data  Procedure/Surgical History     History of knee surgery      Comments: right.  Medications    amphetamine-dextroamphetamine 15 mg oral tablet, 15 mg= 1 tab(s), Oral, daily    amphetamine-dextroamphetamine 30 mg oral tablet, 30 mg= 1 tab(s), Oral, bid    Lexapro 10 mg oral tablet, 10 mg= 1 tab(s), Oral, daily, 3 refills    sildenafil 20 mg oral tablet, 1 tab(s), Oral, daily, 1 refills  Allergies    No known allergies  Social  History    Smoking Status     Current every day smoker     Alcohol - Denies Alcohol Use     Electronic Cigarette/Vaping      Electronic Cigarette Use: Never.     Exercise - Regular exercise     Substance Abuse - Denies Substance Abuse     Tobacco - Current      chewing tobacco, Snuff, 10 year(s).  Family History    Mother: History is negative    Father: History is negative    Sister: History is negative  Immunizations       Scheduled Immunizations       Dose Date(s)       tetanus/diphth/pertuss (Tdap) adult/adol       07/01/2011       Other Immunizations               MMR (measles/mumps/rubella)       06/08/1991       typhoid, inactivated       06/22/1992       Td       10/20/2006

## 2022-02-15 NOTE — PROGRESS NOTES
Chief Complaint    c/o chest and back pain since Sunday, getting worse.  started out in chest, spread to back Monday.  hurts to breath, left side.  denies cough or fever.  does hurt to roll over [id number], feet feel numb.  History of Present Illness      Herer with anterior chest pain since Sunday.  Initially the pain was reporducible then became less so.  Certainly just left of the sternum and will get under the left clavicle.  He has had some slight radiation into the left arm.  It also radiates into his back.  Pain is worse if he lies down.  He denies any significant shortness of breath.  There is a pleuritic component of the pain.  He denies any changes in activity.  He has had no trauma.  Denies fever, chills, sweats, cough, hemoptysis  Review of Systems          ROS reviewed an negative except for symptoms noted in HPI.            Physical Exam   Vitals & Measurements    T: 98.1   F (Tympanic)  HR: 99(Peripheral)  BP: 140/92  SpO2: 96%     HT: 72.75 in  WT: 223 lb  BMI: 29.62       Alert, oriented, no acute distress       Neck is supple without adenopathy        Lungs are clear        Left anterior chest wall tenderness        Lungs are clear        No posterior tenderness        Normal shoulder exam        No upper extremity neurologic exam        Cooperative, appropriate affect        Chest x-ray appears normal        ECG shows normal sinus rhythm, nonspecific S T wave changes, no evidence of ischemia        Troponin, d-dimer, CBC all within normal limits  Assessment/Plan       1. Chest pain (R07.9)         Differential includes cardiac cause, pericarditis, pleurisy, pneumothorax, esophagitis, pulmonary embolism                  This does not appear to be cardiac in nature risk is normal coronary ECG.  Pulmonary embolism unlikely due to negative/normal d-dimer.  There is no evidence for an infectious source.         Discussed lab results with patient.  Suspect likely cause of pleurisy.  He will use NSAIDs over  the next several days.  Symptoms do not improve he will need a follow-up or sooner if they worsen.         Ordered:          [id number] ecg routine ecg w/least 12 lds w/i+r (Charge), Quantity: 1, Chest pain          CBC w/o Diff (Request), Priority: Urgent, Chest pain          D-Dimer (Request), Priority: Urgent, Chest pain          Troponin-I (Request), Priority: Urgent, Chest pain          XR Chest PA/Lat (Request), Chest pain           Patient Information     Name:TOM MICHELE      Address:      [address] [place] [address] [place], [place] [id number]     Sex:Male     YOB: 1980     [phone]     Emergency Contact:[facility], CONTACT     MRN:[id number]     FIN:[id number]     Location[facility]     Date of Service:05/27/2020      Primary Care Physician:       Kevin Izaguirre MD, [phone]      Attending Physician:       Kevin Izaguirre MD, [phone]  Problem List/Past Medical History    Ongoing     Cervical disc degeneration     Cervical Pain     Degenerative Disc Disease       Comments: C4-5     Obese     Obsessive-Compulsive Disorder     Rotator Cuff Tendinitis     Sprain of Neck     Tinea corporis     Tobacco Use Disorder, Continuous    Historical     No qualifying data  Procedure/Surgical History     History of knee surgery      Comments: right.  Medications    Adderall 20 mg oral tablet, 20 mg= 1 tab(s), Oral, bid    escitalopram 10 mg oral tablet    sildenafil 20 mg oral tablet, 20 mg= 1 tab(s), Oral, tid  Allergies    No known allergies  Social History    Smoking Status - 05/27/2020     Current every day smoker     Alcohol - Denies Alcohol Use, 03/09/2011     Exercise - Regular exercise, 03/09/2011     Substance Abuse - Denies Substance Abuse, 03/09/2011     Tobacco - Current, 03/09/2011      Snuff, 10 year(s)., 03/09/2011      Current, 03/08/2011  Family History    Mother: History is negative    Father: History is negative    Sister: History is  negative  Immunizations      Vaccine Date Status          tetanus/diphth/pertuss (Tdap) adult/adol 07/01/2011 Recorded          Td 10/20/2006 Recorded          typhoid, inactivated 06/22/1992 Recorded          MMR (measles/mumps/rubella) 06/08/1991 Recorded  Lab Results          Lab Results (Last 4 results within 90 days)           Troponin I: <0.010 (05/27/20 11:32:00)          WBC: 10.1 [4.5  - 11] (05/27/20 11:32:00)          RBC: 5.51 [4.3  - 5.9] (05/27/20 11:32:00)          Hgb: 17.8 High [13.5 g/dL - 17.5 g/dL] (05/27/20 11:32:00)          Hct: 50.0 [37 % - 53 %] (05/27/20 11:32:00)          MCV: 91 [80 fL - 100 fL] (05/27/20 11:32:00)          MCH: 32.3 [26 pg - 34 pg] (05/27/20 11:32:00)          MCHC: 35.6 [32 g/dL - 36 g/dL] (05/27/20 11:32:00)          RDW: 13.3 [11.5 % - 15.5 %] (05/27/20 11:32:00)          Platelet: 236 [140  - 440] (05/27/20 11:32:00)          MPV: 9.4 [6.5 fL - 11 fL] (05/27/20 11:32:00)          D-Dimer: 0.35 (05/27/20 11:32:00)

## 2022-02-15 NOTE — TELEPHONE ENCOUNTER
---------------------  From: Cynthia Melendrez CMA (Phone Messages Pool ([id number]_[facility]))   To: [facility] Message Pool (32224_[place]);     Sent: 7/16/2021 12:23:29 PM CDT  Subject: Phone Message, sildenafil increase request.     Phone Message    PCP:   MARIBELL KELLOGG      Time of Call:  1200       Person Calling:  Rhonda with [employer].  Phone number:  [phone] extension [phone]    Returned call at: direct call     Note:   Rhonda calls from patient's insurance company - reference number [id number] Rhonda faxed Dr Izaguirre a form to increase sildenafil from 20mg to a recommended dose options are 25mg, 50mg, or 100mg. Rhonda will take a verbal or MARIBELL's assistant can fax the form back. Please advise, nonurgent okay to wait for MARIBELL to address on Monday.    Last office visit and reason:  07-14-21 video visit TFS---------------------  From: Victoria Amato MA ([employer] Message Pool ([id number]_[facility]))   To: Kevin Izaguirre MD;     Sent: 7/19/2021 6:42:05 AM CDT  Subject: FW: Phone Message, sildenafil increase request.

## 2022-02-18 ENCOUNTER — COMMUNICATION - RIVER FALLS (OUTPATIENT)
Dept: FAMILY MEDICINE | Facility: CLINIC | Age: 42
End: 2022-02-18
Payer: COMMERCIAL

## 2022-03-02 NOTE — TELEPHONE ENCOUNTER
---------------------  From: Elizabeth Mccarthy RN (Phone Messages Pool ([id number]_[facility]))   To: Advanced Practice Provider Pool ([id number]_[facility]);     Sent: 2/18/2022 9:30:47 AM CST  Subject: Adderall refill       PCP:  MARIBELL o/c      Time of Call:  9:24am       Person Calling:  pt  Phone number:  [phone]    Note:   VM received from pt, requesting refills of Adderall 30mg and 15mg. Stated he is out of his medication.    Adderall 15mg 1 tab QD #30 on 1/17/22  Adderall 30mg 1 tab BID #60 on 1/17/22    Please advise on refill request.    Last office visit and reason:  11/16/21 ADD f/u [facility]xs filled for one month, no refills, [place] PDMP consulted, no irregularities noted

## 2022-03-02 NOTE — TELEPHONE ENCOUNTER
Entered by Victoria Amato MA on February 24, 2022 8:41:23 AM CST  ---------------------  From: Victoria Amato MA   To: [facility] #[id number]    Sent: 2/24/2022 8:41:23 AM CST  Subject: Medication Management     ** Submitted: **  Order:sildenafil (sildenafil 20 mg oral tablet)  1 tab(s)  Oral  daily  Qty:  90 tab(s)        Days Supply:  90        Refills:  0          Substitutions Allowed     Route To [facility] #[id number]    Signed by Victoria Amato MA  2/24/2022 2:41:00 PM [facility]    ** Submitted: **  Complete:sildenafil (sildenafil 20 mg oral tablet)   Signed by Victoria Amato MA  2/24/2022 2:41:00 PM [facility]    ** Not Approved:  **  sildenafil (SILDENAFIL 20MG TABLETS)  TAKE 1 TABLET BY MOUTH DAILY  Qty:  90 tab(s)        Days Supply:  90        Refills:  0          Substitutions Allowed     Route To [facility] #[id number]   Note from Pharmacy:  **Patient requests 90 days supply**  Signed by Victoria Amato MA            ------------------------------------------  From: [facility] #[id number]  To: Kevin Izaguirre MD  Sent: February 24, 2022 3:59:42 AM CST  Subject: Medication Management  Due: February 23, 2022 1:15:30 PM CST     ** On Hold Pending Signature **     Dispensed Drug: sildenafil (sildenafil 20 mg oral tablet), TAKE 1 TABLET BY MOUTH DAILY  Quantity: 90 tab(s)  Days Supply: 90  Refills: 0  Substitutions Allowed  Notes from Pharmacy: **Patient requests 90 days supply**  ------------------------------------------

## 2022-03-02 NOTE — TELEPHONE ENCOUNTER
---------------------  From: Victoria Amato MA ([facility] Pool ([id number]_[facility]))   To: MAR[facility]ELL Message Pool (32224_[place]);     Sent: 2/2/2022 10:50:50 AM CST  Subject: FW: Medication Management   Due Date/Time: 2/2/2022 3:57:00 PM CST           ------------------------------------------  From: [facility] #[id number]  To: Kevin Izaguirre MD  Sent: January 28, 2022 3:57:47 PM CST  Subject: Medication Management  Due: January 21, 2022 11:05:10 AM CST     ** On Hold Pending Signature **     Dispensed Drug: sildenafil (sildenafil 20 mg oral tablet), TAKE 1 TABLET BY MOUTH DAILY  Quantity: 90 tab(s)  Days Supply: 90  Refills: 0  Substitutions Allowed  Notes from Pharmacy: **Patient requests 90 days supply**  ---------------------------------------------------------------  From: Victoria Amato MA   To: [facility] #[id number]    Sent: 2/2/2022 3:04:54 PM CST  Subject: FW: Medication Management     ** Not Approved: Refill not appropriate, Will discuss when he is due for 4 month f/u in March 2022 **  sildenafil (SILDENAFIL 20MG TABLETS)  TAKE 1 TABLET BY MOUTH DAILY  Qty:  90 tab(s)        Days Supply:  90        Refills:  0          Substitutions Allowed     Route To Pharmacy - [facility] #[id number]   Note from Pharmacy:  **Patient requests 90 days supply**  Signed by Victoria Amato MA

## 2022-03-02 NOTE — NURSING NOTE
Received fax from [facility] re: PA needed for Adderall 30mg tabs bid #60.  Called [phone] to start PA process.  [facility] will fax forms for [facility] to fill out and sign.  Completed forms to be faxed to [facility] when done.

## 2022-03-02 NOTE — TELEPHONE ENCOUNTER
---------------------  From: Jennifer Isaacs LPN (Phone Messages Pool ([id number]_[facility]))   Sent: 1/21/2022 1:08:29 PM CST  Subject: Rx clarification- Ci[place]ondon      Phone Message    PCP:   MARIBELL      Time of Call:  12:15am       Person Calling:  RhondaShiva Nortonlondon  Phone number:  [phone] ext [phone]    Returned call at: attempt x 3 12:58am    Note:   Rhonda DIAZ stating she is calling regarding a medication authorization form.  says she needs to clarify dose- the form says pt gets 30 tabs per 30 days but per documentation looks like pt should get 60 tabs per 30 days.    Called number on fax [phone] and spoke with Puja. Informed her that per Rx pt takes 1 30mg tab BID so 60 tabs for 30 days would be correct.    Last office visit and reason:  11/17/21 ADD follow up

## 2022-03-02 NOTE — TELEPHONE ENCOUNTER
---------------------  From: Sheila Smith RN (Phone Messages Pool [email or web address])   To: [facility] Message Pool ([id number]_[place] [email or web address];     Sent: 1/17/2022 9:41:41 AM CST  Subject: Prescription refills       PCP:   MARIBELL      Time of Call:  0935       Person Calling:  Pt  Phone number:  [phone]    Note:   Pt calling requesting a refill of Adderall 15mg- Last filled for 30 tabs on 11/17/21, Adderall 30mg-last filled for 60 tabs 11/17/21, and sildenafil 20mg-last filled for 30 tabs and 1 refill on 9/14/21.  No current RTC in chart.  Please advise.    Last office visit and reason:  11/17/21 ADD Follow up---------------------  From: Svitlana Garza LPN ([employer] Message View3 ([id number]_[facility]))   To: Kevin Izaguirre MD;     Sent: 1/17/2022 10:31:33 AM CST  Subject: FW: Prescription refills  ** Submitted: **  Order:amphetamine-dextroamphetamine (amphetamine-dextroamphetamine 30 mg oral tablet)  1 tab(s)  Oral  bid  Qty:  60 tab(s)        Refills:  0          Substitutions Allowed     Route To [id number]    Signed by Kevin Izaguirre MD  1/17/2022 6:57:00 PM [facility]    ** Submitted: **  Order:amphetamine-dextroamphetamine (amphetamine-dextroamphetamine 15 mg oral tablet)  1 tab(s)  Oral  daily  Qty:  30 tab(s)        Refills:  0          Substitutions Allowed     Route To Pharmacy [facility] #[id number]    Signed by Kevin Izaguirre MD  1/17/2022 6:57:00 PM [facility]  ** Submitted: **  Order:amphetamine-dextroamphetamine (amphetamine-dextroamphetamine 30 mg oral tablet)  1 tab(s)  Oral  bid  Qty:  60 tab(s)        Refills:  0          Substitutions Allowed     Route To Pharmacy [facility] #[id number]    Signed by Kevin Izaguirre MD  1/17/2022 7:00:00 PM [facility]    ** Submitted: **  Order:amphetamine-dextroamphetamine (amphetamine-dextroamphetamine 15 mg oral tablet)  1 tab(s)  Oral  daily  Qty:  30 tab(s)        Refills:  0          Substitutions Allowed     Route To  Pharmacy - [facility] #[id number]    Signed by Kevin Izaguirre MD  1/17/2022 7:00:00 PM [facility]---------------------  From: Kevin Izaguirre MD   To: [employer] Pool ([id number]_[place] [facility] [place]);     Sent: 1/17/2022 1:02:37 PM CST  Subject: RE: Prescription refillsno answer, patient does not have voice mail set up.Pt called stating he does not have refill of Sildenafil that he had requested. I sent in refill for 30 tabs and 1 refill. Pt is going to call back if price is better getting 60 or 90 tabs at a time.

## 2022-03-16 ENCOUNTER — TELEPHONE (OUTPATIENT)
Dept: FAMILY MEDICINE | Facility: CLINIC | Age: 42
End: 2022-03-16
Payer: COMMERCIAL

## 2022-03-16 DIAGNOSIS — F98.8 ATTENTION DEFICIT DISORDER, UNSPECIFIED HYPERACTIVITY PRESENCE: Primary | ICD-10-CM

## 2022-03-16 RX ORDER — DEXTROAMPHETAMINE SACCHARATE, AMPHETAMINE ASPARTATE, DEXTROAMPHETAMINE SULFATE AND AMPHETAMINE SULFATE 7.5; 7.5; 7.5; 7.5 MG/1; MG/1; MG/1; MG/1
60 TABLET ORAL DAILY
Qty: 60 TABLET | Refills: 0 | Status: SHIPPED | OUTPATIENT
Start: 2022-03-16 | End: 2022-04-15

## 2022-03-16 RX ORDER — DEXTROAMPHETAMINE SACCHARATE, AMPHETAMINE ASPARTATE, DEXTROAMPHETAMINE SULFATE AND AMPHETAMINE SULFATE 3.75; 3.75; 3.75; 3.75 MG/1; MG/1; MG/1; MG/1
15 TABLET ORAL DAILY
Qty: 30 TABLET | Refills: 0 | Status: SHIPPED | OUTPATIENT
Start: 2022-03-16 | End: 2022-04-15

## 2022-03-16 RX ORDER — DEXTROAMPHETAMINE SACCHARATE, AMPHETAMINE ASPARTATE, DEXTROAMPHETAMINE SULFATE AND AMPHETAMINE SULFATE 3.75; 3.75; 3.75; 3.75 MG/1; MG/1; MG/1; MG/1
15 TABLET ORAL DAILY
COMMUNITY
Start: 2021-11-17 | End: 2022-03-16

## 2022-03-16 RX ORDER — ESCITALOPRAM OXALATE 10 MG/1
10 TABLET ORAL DAILY
COMMUNITY
Start: 2021-02-12 | End: 2022-04-18

## 2022-03-16 RX ORDER — SILDENAFIL CITRATE 20 MG/1
TABLET ORAL
COMMUNITY
Start: 2021-09-14 | End: 2022-05-24

## 2022-03-16 RX ORDER — DEXTROAMPHETAMINE SACCHARATE, AMPHETAMINE ASPARTATE, DEXTROAMPHETAMINE SULFATE AND AMPHETAMINE SULFATE 7.5; 7.5; 7.5; 7.5 MG/1; MG/1; MG/1; MG/1
30 TABLET ORAL DAILY
COMMUNITY
Start: 2021-11-17 | End: 2022-03-16

## 2022-03-16 NOTE — TELEPHONE ENCOUNTER
Reason for Call:  Medication or medication refill:    Do you use a [facility]?  Name of the pharmacy and phone number for the [facility]    Name of the medication requested: Adderall: 15mg and 30mg Rx's    Other request:     Can we leave a detailed message on this number? YES    Phone number patient can be reached at: Cell number on file:    Telephone Information:   Mobile [phone]           Best Time: anytime    Call taken on 3/16/2022 at 1:14 PM by Felisha Alexander

## 2022-04-15 ENCOUNTER — TELEPHONE (OUTPATIENT)
Dept: FAMILY MEDICINE | Facility: CLINIC | Age: 42
End: 2022-04-15
Payer: COMMERCIAL

## 2022-04-15 DIAGNOSIS — F98.8 ATTENTION DEFICIT DISORDER, UNSPECIFIED HYPERACTIVITY PRESENCE: ICD-10-CM

## 2022-04-15 PROBLEM — F90.9 ATTENTION DEFICIT DISORDER OF ADULT WITH HYPERACTIVITY: Status: ACTIVE | Noted: 2022-04-15

## 2022-04-15 RX ORDER — DEXTROAMPHETAMINE SACCHARATE, AMPHETAMINE ASPARTATE, DEXTROAMPHETAMINE SULFATE AND AMPHETAMINE SULFATE 3.75; 3.75; 3.75; 3.75 MG/1; MG/1; MG/1; MG/1
15 TABLET ORAL DAILY
Qty: 7 TABLET | Refills: 0 | Status: SHIPPED | OUTPATIENT
Start: 2022-04-15 | End: 2022-04-22

## 2022-04-15 RX ORDER — DEXTROAMPHETAMINE SACCHARATE, AMPHETAMINE ASPARTATE, DEXTROAMPHETAMINE SULFATE AND AMPHETAMINE SULFATE 7.5; 7.5; 7.5; 7.5 MG/1; MG/1; MG/1; MG/1
60 TABLET ORAL DAILY
Qty: 14 TABLET | Refills: 0 | Status: SHIPPED | OUTPATIENT
Start: 2022-04-15 | End: 2022-04-18

## 2022-04-15 NOTE — TELEPHONE ENCOUNTER
Last visit 11/2021  Last filled 3/16/2022    Please call patient - needs appointment for further refills. If gets on the schedule, I will send a supply to cover until he can be seen by Dr. Izaguirre.

## 2022-04-15 NOTE — TELEPHONE ENCOUNTER
Reason for Call:  Medication or medication refill:    Do you use a [facility]?  Name of the pharmacy and phone number for the current request:  [facility]    Name of the medication requested: [id number]    Other request: needs refill    Can we leave a detailed message on this number? YES    Phone number patient can be reached at: Cell number on file:    Telephone Information:   Mobile [phone]           Best Time: any    Call taken on 4/15/2022 at 4:05 PM by IRENE JACOB

## 2022-04-15 NOTE — TELEPHONE ENCOUNTER
Patient returned call, patient is scheduled to see Dr. Izaguirre on 4.18.22. Patient states he has two pills left of medication.

## 2022-04-18 ENCOUNTER — VIRTUAL VISIT (OUTPATIENT)
Dept: FAMILY MEDICINE | Facility: CLINIC | Age: 42
End: 2022-04-18
Payer: COMMERCIAL

## 2022-04-18 DIAGNOSIS — F90.9 ATTENTION DEFICIT DISORDER OF ADULT WITH HYPERACTIVITY: Primary | ICD-10-CM

## 2022-04-18 DIAGNOSIS — F98.8 ATTENTION DEFICIT DISORDER, UNSPECIFIED HYPERACTIVITY PRESENCE: ICD-10-CM

## 2022-04-18 PROCEDURE — 99213 OFFICE O/P EST LOW 20 MIN: CPT | Mod: 95 | Performed by: FAMILY MEDICINE

## 2022-04-18 RX ORDER — DEXTROAMPHETAMINE SACCHARATE, AMPHETAMINE ASPARTATE, DEXTROAMPHETAMINE SULFATE AND AMPHETAMINE SULFATE 7.5; 7.5; 7.5; 7.5 MG/1; MG/1; MG/1; MG/1
60 TABLET ORAL DAILY
Qty: 60 TABLET | Refills: 0 | Status: SHIPPED | OUTPATIENT
Start: 2022-04-20 | End: 2022-07-08

## 2022-04-18 RX ORDER — DEXTROAMPHETAMINE SACCHARATE, AMPHETAMINE ASPARTATE, DEXTROAMPHETAMINE SULFATE AND AMPHETAMINE SULFATE 3.75; 3.75; 3.75; 3.75 MG/1; MG/1; MG/1; MG/1
15 TABLET ORAL DAILY
Qty: 30 TABLET | Refills: 0 | Status: SHIPPED | OUTPATIENT
Start: 2022-04-20 | End: 2023-03-13

## 2022-04-18 RX ORDER — DEXTROAMPHETAMINE SACCHARATE, AMPHETAMINE ASPARTATE, DEXTROAMPHETAMINE SULFATE AND AMPHETAMINE SULFATE 7.5; 7.5; 7.5; 7.5 MG/1; MG/1; MG/1; MG/1
60 TABLET ORAL DAILY
Qty: 30 TABLET | Refills: 0 | Status: SHIPPED | OUTPATIENT
Start: 2022-05-21 | End: 2022-06-22

## 2022-04-18 RX ORDER — DEXTROAMPHETAMINE SACCHARATE, AMPHETAMINE ASPARTATE, DEXTROAMPHETAMINE SULFATE AND AMPHETAMINE SULFATE 3.75; 3.75; 3.75; 3.75 MG/1; MG/1; MG/1; MG/1
15 TABLET ORAL DAILY
Qty: 30 TABLET | Refills: 0 | Status: SHIPPED | OUTPATIENT
Start: 2022-05-21 | End: 2022-06-20

## 2022-04-18 NOTE — PROGRESS NOTES
Joe is a 41 year old who is being evaluated via a billable video visit.      How would you like to obtain your AVS? Mail a copy  If the video visit is dropped, the invitation should be resent by: Text to cell phone: [phone]  Will anyone else be joining your video visit? No      Video Start Time: 12:28 PM    Assessment & Plan     Attention deficit disorder of adult with hyperactivity  Doing well, continue same dose     Refill for 2 months     Follow up in 4 months     PDMP queried, no concerns                    Return in about 4 months (around 8/18/2022).    Kevin Izaguirre MD  [facility]    Subjective   Joe is a 41 year old who presents for the following health issues  accompanied by his Self.    HPI     Medication Followup of ADHD    Taking Medication as prescribed: yes    Side Effects:  None    Medication Helping Symptoms:  yes         Review of Systems   Constitutional, HEENT, cardiovascular, pulmonary, gi and gu systems are negative, except as otherwise noted.      Objective           Vitals:  No vitals were obtained today due to virtual visit.    Physical Exam   GENERAL: Healthy, alert and no distress  EYES: Eyes grossly normal to inspection.  No discharge or erythema, or obvious scleral/conjunctival abnormalities.  RESP: No audible wheeze, cough, or visible cyanosis.  No visible retractions or increased work of breathing.    SKIN: Visible skin clear. No significant rash, abnormal pigmentation or lesions.  NEURO: Cranial nerves grossly intact.  Mentation and speech appropriate for age.  PSYCH: Mentation appears normal, affect normal/bright, judgement and insight intact, normal speech and appearance well-groomed.                Video-Visit Details    Type of service:  Video Visit    Video End Time:12:34 PM    Originating Location (pt. Location): Other work    Distant Location (provider location):  [facility]     Platform used for Video Visit:  [employer]

## 2022-05-24 DIAGNOSIS — N52.9 ED (ERECTILE DYSFUNCTION): Primary | ICD-10-CM

## 2022-05-25 RX ORDER — SILDENAFIL CITRATE 20 MG/1
20 TABLET ORAL DAILY
Qty: 90 TABLET | Refills: 0 | Status: SHIPPED | OUTPATIENT
Start: 2022-05-25 | End: 2022-09-12

## 2022-05-25 NOTE — TELEPHONE ENCOUNTER
Prescription approved per [facility] Refill Protocol.    Last Written Prescription Date: 2/24/22  Last Fill Quantity: 90,  # refills: 0   Last office visit with prescribing provider:  4/18/22

## 2022-06-21 ENCOUNTER — TELEPHONE (OUTPATIENT)
Dept: FAMILY MEDICINE | Facility: CLINIC | Age: 42
End: 2022-06-21

## 2022-06-21 DIAGNOSIS — F90.9 ATTENTION DEFICIT DISORDER OF ADULT WITH HYPERACTIVITY: ICD-10-CM

## 2022-06-21 NOTE — TELEPHONE ENCOUNTER
Reason for Call:  Medication or medication refill:    Do you use a [facility]?  Name of the pharmacy and phone number for the current request:  [facility]    Name of the medication requested: Adderall 30 mg    Other request:Patient calling wondering why he was only given 30 day supply of the 30 mg instead of the 60 tablets. Patient did  the 30 tablets of the 30 mg wondering if the other 30 tablets can get called in. Please advise.     Can we leave a detailed message on this number? YES    Phone number patient can be reached at: Cell number on file:    Telephone Information:   Mobile [phone]           Best Time: anytime    Call taken on 6/21/2022 at 12:33 PM by Sahra Augustin

## 2022-06-22 RX ORDER — DEXTROAMPHETAMINE SACCHARATE, AMPHETAMINE ASPARTATE, DEXTROAMPHETAMINE SULFATE AND AMPHETAMINE SULFATE 7.5; 7.5; 7.5; 7.5 MG/1; MG/1; MG/1; MG/1
60 TABLET ORAL DAILY
Qty: 30 TABLET | Refills: 0 | Status: SHIPPED | OUTPATIENT
Start: 2022-06-22 | End: 2022-09-19

## 2022-07-07 ENCOUNTER — TELEPHONE (OUTPATIENT)
Dept: FAMILY MEDICINE | Facility: CLINIC | Age: 42
End: 2022-07-07

## 2022-07-07 DIAGNOSIS — F90.9 ATTENTION DEFICIT DISORDER OF ADULT WITH HYPERACTIVITY: ICD-10-CM

## 2022-07-07 DIAGNOSIS — F98.8 ATTENTION DEFICIT DISORDER, UNSPECIFIED HYPERACTIVITY PRESENCE: ICD-10-CM

## 2022-07-07 RX ORDER — DEXTROAMPHETAMINE SACCHARATE, AMPHETAMINE ASPARTATE, DEXTROAMPHETAMINE SULFATE AND AMPHETAMINE SULFATE 7.5; 7.5; 7.5; 7.5 MG/1; MG/1; MG/1; MG/1
60 TABLET ORAL DAILY
Qty: 30 TABLET | Refills: 0 | Status: CANCELLED | OUTPATIENT
Start: 2022-07-07

## 2022-07-07 NOTE — TELEPHONE ENCOUNTER
Reason for Call:  Medication or medication refill:    Do you use a [facility]?  Name of the pharmacy and phone number for the current request:   [facility]    Name of the medication requested: adderall 60 tabs at 30mg.  Also 30 tabs at 15mg of the other  /'  Other request: Patient is leaving [facility] for work on Monday 7.11 and must have them before that    Can we leave a detailed message on this number? YES    Phone number patient can be reached at: Home number on file [phone] (home)    Best Time: stat    Call taken on 7/7/2022 at 2:50 PM by IRENE JACOB

## 2022-07-08 RX ORDER — DEXTROAMPHETAMINE SACCHARATE, AMPHETAMINE ASPARTATE, DEXTROAMPHETAMINE SULFATE AND AMPHETAMINE SULFATE 7.5; 7.5; 7.5; 7.5 MG/1; MG/1; MG/1; MG/1
60 TABLET ORAL DAILY
Qty: 60 TABLET | Refills: 0 | Status: SHIPPED | OUTPATIENT
Start: 2022-07-08 | End: 2022-07-11

## 2022-07-08 NOTE — TELEPHONE ENCOUNTER
Patient called to f/u on med refill.  Can this be sent to pharmacy today.  Please address as GTG is OC till 7/18/2022.

## 2022-07-11 DIAGNOSIS — F98.8 ATTENTION DEFICIT DISORDER, UNSPECIFIED HYPERACTIVITY PRESENCE: ICD-10-CM

## 2022-07-11 RX ORDER — DEXTROAMPHETAMINE SACCHARATE, AMPHETAMINE ASPARTATE, DEXTROAMPHETAMINE SULFATE AND AMPHETAMINE SULFATE 3.75; 3.75; 3.75; 3.75 MG/1; MG/1; MG/1; MG/1
15 TABLET ORAL EVERY EVENING
Qty: 30 TABLET | Refills: 0 | Status: SHIPPED | OUTPATIENT
Start: 2022-07-11 | End: 2022-09-07

## 2022-07-11 RX ORDER — DEXTROAMPHETAMINE SACCHARATE, AMPHETAMINE ASPARTATE, DEXTROAMPHETAMINE SULFATE AND AMPHETAMINE SULFATE 7.5; 7.5; 7.5; 7.5 MG/1; MG/1; MG/1; MG/1
60 TABLET ORAL DAILY
Qty: 60 TABLET | Refills: 0 | Status: SHIPPED | OUTPATIENT
Start: 2022-07-11 | End: 2022-08-09

## 2022-07-11 NOTE — TELEPHONE ENCOUNTER
Patient called, spoke to TC, notified that [facility] does not have Rx for Adderall 30 mg 60 tabs no refill sent on 7/8/2022.    This writer called pharmacy.    Per Pradeep, Pharmacist the patient did  Adderall 30 mg dated 6/22/22, that was for 15 days on 6/22/22.    Verified that [facility] does not have an prescription on fill for 60 tabs dated. 7/8/2022.    Spoke to patient and he stated he should have an prescription for Adderall 15 mg, patient takes 1 tab in pm.    Patient is out of medication.    Sending to [facility] as [facility]TG , [facility] and [facility] are out of clinic.

## 2022-08-09 ENCOUNTER — MYC MEDICAL ADVICE (OUTPATIENT)
Dept: FAMILY MEDICINE | Facility: CLINIC | Age: 42
End: 2022-08-09

## 2022-08-09 ENCOUNTER — MYC REFILL (OUTPATIENT)
Dept: FAMILY MEDICINE | Facility: CLINIC | Age: 42
End: 2022-08-09

## 2022-08-09 DIAGNOSIS — F98.8 ATTENTION DEFICIT DISORDER, UNSPECIFIED HYPERACTIVITY PRESENCE: ICD-10-CM

## 2022-08-10 RX ORDER — DEXTROAMPHETAMINE SACCHARATE, AMPHETAMINE ASPARTATE, DEXTROAMPHETAMINE SULFATE AND AMPHETAMINE SULFATE 7.5; 7.5; 7.5; 7.5 MG/1; MG/1; MG/1; MG/1
60 TABLET ORAL DAILY
Qty: 60 TABLET | Refills: 0 | Status: SHIPPED | OUTPATIENT
Start: 2022-08-10 | End: 2022-09-19

## 2022-08-24 NOTE — TELEPHONE ENCOUNTER
JADA called Judah at [phone] for PA and was told that medication was processed yesterday, no further questions asked.

## 2022-09-07 DIAGNOSIS — F98.8 ATTENTION DEFICIT DISORDER, UNSPECIFIED HYPERACTIVITY PRESENCE: ICD-10-CM

## 2022-09-07 RX ORDER — DEXTROAMPHETAMINE SACCHARATE, AMPHETAMINE ASPARTATE, DEXTROAMPHETAMINE SULFATE AND AMPHETAMINE SULFATE 3.75; 3.75; 3.75; 3.75 MG/1; MG/1; MG/1; MG/1
15 TABLET ORAL EVERY EVENING
Qty: 30 TABLET | Refills: 0 | Status: SHIPPED | OUTPATIENT
Start: 2022-09-07 | End: 2022-10-17

## 2022-09-07 NOTE — TELEPHONE ENCOUNTER
Medication Question or Refill        What medication are you calling about (include dose and sig)?: Adderal 15mg     Controlled Substance Agreement on file:   CSA -- Patient Level:    CSA: None found at the patient level.       Who prescribed the medication?:      Do you need a refill? Yes:     When did you use the medication last?     Patient offered an appointment? No    Do you have any questions or concerns?  No    Preferred Pharmacy:   [id number] - [place], [place] - [address] [facility] [address]  [address] [place] [address]  Phone: [phone] Fax: [phone]      Could we send this information to you in [employer] or would you prefer to receive a phone call?:   Patient would prefer a phone call   Okay to leave a detailed message?: Yes at Home number on file [phone] (home)

## 2022-09-07 NOTE — TELEPHONE ENCOUNTER
Last Written Prescription Date:  7/11/22  Last Fill Quantity: 30,  # refills: 0   Last office visit: 4/18/22, ADD  Future Office Visit:  Was due 8/2022 for 4 month follow up       Call to patient.   Patient says he did not get the additional 30 days of the 15 mg tabs and was hoping to get that filled.  Transferred patient to scheduling to make medication(s) check appointment    Please advise on refill request of 15 mg Adderall. Rx is pended.    Rebeca Del Valle RN

## 2022-09-10 DIAGNOSIS — N52.9 ED (ERECTILE DYSFUNCTION): ICD-10-CM

## 2022-09-12 RX ORDER — SILDENAFIL CITRATE 20 MG/1
TABLET ORAL
Qty: 90 TABLET | Refills: 0 | Status: SHIPPED | OUTPATIENT
Start: 2022-09-12 | End: 2022-11-29

## 2022-09-12 NOTE — TELEPHONE ENCOUNTER
Prescription approved per [facility] Refill Protocol.    Last Written Prescription Date: 5/25/22  Last Fill Quantity: 90,  # refills: 0   Last office visit: 4/18/22   Future Office Visit:   Next 5 appointments (look out 90 days)    Sep 19, 2022 11:00 AM  (Arrive by 10:40 AM)  Provider Visit with Kevin Izaguirre MD  [facility] ([facility] ) [address] [place] [address] [phone]

## 2022-09-19 ENCOUNTER — VIRTUAL VISIT (OUTPATIENT)
Dept: FAMILY MEDICINE | Facility: CLINIC | Age: 42
End: 2022-09-19
Payer: COMMERCIAL

## 2022-09-19 DIAGNOSIS — F41.1 GAD (GENERALIZED ANXIETY DISORDER): ICD-10-CM

## 2022-09-19 DIAGNOSIS — F41.1 GAD (GENERALIZED ANXIETY DISORDER): Primary | ICD-10-CM

## 2022-09-19 DIAGNOSIS — F90.9 ATTENTION DEFICIT DISORDER OF ADULT WITH HYPERACTIVITY: ICD-10-CM

## 2022-09-19 PROCEDURE — 99213 OFFICE O/P EST LOW 20 MIN: CPT | Mod: 95 | Performed by: FAMILY MEDICINE

## 2022-09-19 RX ORDER — DEXTROAMPHETAMINE SACCHARATE, AMPHETAMINE ASPARTATE, DEXTROAMPHETAMINE SULFATE AND AMPHETAMINE SULFATE 7.5; 7.5; 7.5; 7.5 MG/1; MG/1; MG/1; MG/1
60 TABLET ORAL DAILY
Qty: 60 TABLET | Refills: 0 | Status: SHIPPED | OUTPATIENT
Start: 2022-10-21 | End: 2022-12-07

## 2022-09-19 RX ORDER — ESCITALOPRAM OXALATE 10 MG/1
TABLET ORAL
Qty: 90 TABLET | Refills: 0 | Status: SHIPPED | OUTPATIENT
Start: 2022-09-19 | End: 2022-12-07

## 2022-09-19 RX ORDER — DEXTROAMPHETAMINE SACCHARATE, AMPHETAMINE ASPARTATE, DEXTROAMPHETAMINE SULFATE AND AMPHETAMINE SULFATE 7.5; 7.5; 7.5; 7.5 MG/1; MG/1; MG/1; MG/1
60 TABLET ORAL DAILY
Qty: 30 TABLET | Refills: 0 | Status: SHIPPED | OUTPATIENT
Start: 2022-09-21 | End: 2022-10-26

## 2022-09-19 RX ORDER — ESCITALOPRAM OXALATE 10 MG/1
10 TABLET ORAL DAILY
Qty: 30 TABLET | Refills: 3 | Status: SHIPPED | OUTPATIENT
Start: 2022-09-19 | End: 2022-09-19

## 2022-09-19 NOTE — PROGRESS NOTES
Joe is a 42 year old who is being evaluated via a billable video visit.      How would you like to obtain your AVS? MyChart  If the video visit is dropped, the invitation should be resent by: Text to cell phone: [phone]  Will anyone else be joining your video visit? No          Assessment & Plan     Attention deficit disorder of adult with hyperactivity  Doing well, continue same dose   Refill for 2 months   Follow up in 4 months   PDMP queried, no concerns   - amphetamine-dextroamphetamine (ADDERALL) 30 MG tablet; Take 2 tablets (60 mg) by mouth daily  - amphetamine-dextroamphetamine (ADDERALL) 30 MG tablet; Take 2 tablets (60 mg) by mouth daily    CAROLYN (generalized anxiety disorder  We will start escitalopram 10 mg daily.  He has had success with this in the past.  Follow-up in about 2 months  - escitalopram (LEXAPRO) 10 MG tablet; Take 1 tablet (10 mg) by mouth daily                 Return in about 4 months (around 1/19/2023).    Kevin Izaguirre MD  [facility]    Subjective   Joe is a 42 year old accompanied by his self, presenting for the following health issues:  A.D.H.D (Verbal consent given for video visit via phone.  ADHD follow up, refill meds. Discuss resuming Lexapro)      HPI     Medication Followup of ADHD    Taking Medication as prescribed: yes    Side Effects:  None    Medication Helping Symptoms:  yes    Also discuss resuming Lexapro for depression.  He had seen psychiatry in the past until they went out of business.    Patent presents for attention deficit disorder follow up.  There have been no problems with the medication. The current dose is controlling symptoms.  He would like to resume escitalopram.  He has been on this the past with good success.  He finds he has more anxiety recently especially social anxiety.    Review of Systems   Constitutional, HEENT, cardiovascular, pulmonary, gi and gu systems are negative, except as otherwise noted.      Objective            Vitals:  No vitals were obtained today due to virtual visit.    Physical Exam   GENERAL: Healthy, alert and no distress  EYES: Eyes grossly normal to inspection.  No discharge or erythema, or obvious scleral/conjunctival abnormalities.  RESP: No audible wheeze, cough, or visible cyanosis.  No visible retractions or increased work of breathing.    SKIN: Visible skin clear. No significant rash, abnormal pigmentation or lesions.  NEURO: Cranial nerves grossly intact.  Mentation and speech appropriate for age.  PSYCH: Mentation appears normal, affect normal/bright, judgement and insight intact, normal speech and appearance well-groomed.                Video-Visit Details    Video Start Time: 11:50 AM    Type of service:  Video Visit    Video End Time:12:02 PM    Originating Location (pt. Location): Other work    Distant Location (provider location):  [facility]     Platform used for Video Visit: [employer]

## 2022-10-03 ENCOUNTER — HEALTH MAINTENANCE LETTER (OUTPATIENT)
Age: 42
End: 2022-10-03

## 2022-10-17 ENCOUNTER — TELEPHONE (OUTPATIENT)
Dept: FAMILY MEDICINE | Facility: CLINIC | Age: 42
End: 2022-10-17

## 2022-10-17 DIAGNOSIS — F98.8 ATTENTION DEFICIT DISORDER, UNSPECIFIED HYPERACTIVITY PRESENCE: ICD-10-CM

## 2022-10-17 RX ORDER — DEXTROAMPHETAMINE SACCHARATE, AMPHETAMINE ASPARTATE, DEXTROAMPHETAMINE SULFATE AND AMPHETAMINE SULFATE 3.75; 3.75; 3.75; 3.75 MG/1; MG/1; MG/1; MG/1
15 TABLET ORAL EVERY EVENING
Qty: 30 TABLET | Refills: 0 | Status: SHIPPED | OUTPATIENT
Start: 2022-10-17 | End: 2022-12-07

## 2022-10-17 NOTE — TELEPHONE ENCOUNTER
10-17-22  Medication Question or Refill      What medication are you calling about (include dose and sig)?:   amphetamine-dextroamphetamine (ADDERALL) 30 MG tablet  &   amphetamine-dextroamphetamine (ADDERALL) 15 MG tablet    Who prescribed the medication?: goblirsch    Do you need a refill? Yes:     When did you use the medication last? daily    Patient offered an appointment? No    Do you have any questions or concerns?  No    Preferred Pharmacy:   [facility][id number] - [place], [place] - [address] [facility] [address]  [address] [place] [address]  Phone: [phone] Fax: [phone]      Could we send this information to you in [facility] or would you prefer to receive a phone call?:   No preference   Okay to leave a detailed message?: Yes at Home number on file [phone] (home)

## 2022-10-26 ENCOUNTER — TELEPHONE (OUTPATIENT)
Dept: NURSING | Facility: CLINIC | Age: 42
End: 2022-10-26

## 2022-10-26 DIAGNOSIS — F90.9 ATTENTION DEFICIT DISORDER OF ADULT WITH HYPERACTIVITY: Primary | ICD-10-CM

## 2022-10-26 RX ORDER — DEXTROAMPHETAMINE SACCHARATE, AMPHETAMINE ASPARTATE, DEXTROAMPHETAMINE SULFATE AND AMPHETAMINE SULFATE 7.5; 7.5; 7.5; 7.5 MG/1; MG/1; MG/1; MG/1
60 TABLET ORAL DAILY
Qty: 30 TABLET | Refills: 0 | Status: SHIPPED | OUTPATIENT
Start: 2022-10-26 | End: 2022-12-07

## 2022-11-02 ENCOUNTER — TELEPHONE (OUTPATIENT)
Dept: FAMILY MEDICINE | Facility: CLINIC | Age: 42
End: 2022-11-02

## 2022-11-02 DIAGNOSIS — F90.9 ATTENTION DEFICIT DISORDER OF ADULT WITH HYPERACTIVITY: Primary | ICD-10-CM

## 2022-11-02 RX ORDER — DEXTROAMPHETAMINE SACCHARATE, AMPHETAMINE ASPARTATE, DEXTROAMPHETAMINE SULFATE AND AMPHETAMINE SULFATE 3.75; 3.75; 3.75; 3.75 MG/1; MG/1; MG/1; MG/1
TABLET ORAL
Qty: 150 TABLET | Refills: 0 | Status: SHIPPED | OUTPATIENT
Start: 2022-11-02 | End: 2022-12-07

## 2022-11-02 NOTE — TELEPHONE ENCOUNTER
Medication Question or Refill        What medication are you calling about (include dose and sig)?: Adderall    Controlled Substance Agreement on file:   CSA -- Patient Level:    CSA: None found at the patient level.       Who prescribed the medication?: GTG    Do you need a refill? Yes: Pharmacy/National shortage of 30 mg Adderall tabs. Patient is completely out and wondering if his Rx for 30 mg tabs can be changed to 15 mg tabs (taking 2). Pharmacy says they have plenty of 15 mg tabs to do this for patient.    Patient offered an appointment? No    Do you have any questions or concerns?  No    Preferred Pharmacy:   [id number] - [place], [place] - [address] [facility] [address]  [address] [place] [address]  Phone: [phone] Fax: [phone]      Could we send this information to you in [employer] or would you prefer to receive a phone call?:   Patient would prefer a phone call   Okay to leave a detailed message?: Yes at Home number on file [phone] (home)      Pharmacy/National shortage of 30 mg Adderall tabs

## 2022-11-29 DIAGNOSIS — N52.9 ED (ERECTILE DYSFUNCTION): ICD-10-CM

## 2022-11-29 RX ORDER — SILDENAFIL CITRATE 20 MG/1
20 TABLET ORAL DAILY
Qty: 90 TABLET | Refills: 0 | Status: SHIPPED | OUTPATIENT
Start: 2022-11-29 | End: 2023-05-22

## 2022-11-29 NOTE — TELEPHONE ENCOUNTER
Prescription approved per [facility] Refill Protocol.    TEAGAN Tolentino [facility]

## 2022-12-06 PROBLEM — F17.209 TOBACCO USE DISORDER, CONTINUOUS: Status: ACTIVE | Noted: 2022-12-06

## 2022-12-06 PROBLEM — M75.80 ROTATOR CUFF TENDINITIS: Status: ACTIVE | Noted: 2022-12-06

## 2022-12-07 ENCOUNTER — VIRTUAL VISIT (OUTPATIENT)
Dept: FAMILY MEDICINE | Facility: CLINIC | Age: 42
End: 2022-12-07
Payer: COMMERCIAL

## 2022-12-07 DIAGNOSIS — F33.0 MAJOR DEPRESSIVE DISORDER, RECURRENT EPISODE, MILD (H): ICD-10-CM

## 2022-12-07 DIAGNOSIS — F90.9 ATTENTION DEFICIT DISORDER OF ADULT WITH HYPERACTIVITY: Primary | ICD-10-CM

## 2022-12-07 DIAGNOSIS — F41.1 GAD (GENERALIZED ANXIETY DISORDER): ICD-10-CM

## 2022-12-07 PROCEDURE — 99213 OFFICE O/P EST LOW 20 MIN: CPT | Mod: 95 | Performed by: FAMILY MEDICINE

## 2022-12-07 RX ORDER — DEXTROAMPHETAMINE SACCHARATE, AMPHETAMINE ASPARTATE, DEXTROAMPHETAMINE SULFATE AND AMPHETAMINE SULFATE 7.5; 7.5; 7.5; 7.5 MG/1; MG/1; MG/1; MG/1
60 TABLET ORAL DAILY
Qty: 60 TABLET | Refills: 0 | Status: CANCELLED | OUTPATIENT
Start: 2022-12-07

## 2022-12-07 RX ORDER — ESCITALOPRAM OXALATE 10 MG/1
10 TABLET ORAL DAILY
Qty: 90 TABLET | Refills: 1 | Status: SHIPPED | OUTPATIENT
Start: 2022-12-07 | End: 2023-02-09

## 2022-12-07 RX ORDER — DEXTROAMPHETAMINE SACCHARATE, AMPHETAMINE ASPARTATE, DEXTROAMPHETAMINE SULFATE AND AMPHETAMINE SULFATE 3.75; 3.75; 3.75; 3.75 MG/1; MG/1; MG/1; MG/1
30 TABLET ORAL 2 TIMES DAILY
Qty: 120 TABLET | Refills: 0 | Status: SHIPPED | OUTPATIENT
Start: 2023-01-05 | End: 2023-04-03

## 2022-12-07 RX ORDER — DEXTROAMPHETAMINE SACCHARATE, AMPHETAMINE ASPARTATE, DEXTROAMPHETAMINE SULFATE AND AMPHETAMINE SULFATE 3.75; 3.75; 3.75; 3.75 MG/1; MG/1; MG/1; MG/1
30 TABLET ORAL 2 TIMES DAILY
Qty: 120 TABLET | Refills: 0 | Status: SHIPPED | OUTPATIENT
Start: 2022-12-07 | End: 2023-02-09

## 2022-12-07 RX ORDER — DEXTROAMPHETAMINE SACCHARATE, AMPHETAMINE ASPARTATE, DEXTROAMPHETAMINE SULFATE AND AMPHETAMINE SULFATE 3.75; 3.75; 3.75; 3.75 MG/1; MG/1; MG/1; MG/1
15 TABLET ORAL EVERY EVENING
Qty: 30 TABLET | Refills: 0 | Status: CANCELLED | OUTPATIENT
Start: 2022-12-07

## 2022-12-07 RX ORDER — DEXTROAMPHETAMINE SACCHARATE, AMPHETAMINE ASPARTATE, DEXTROAMPHETAMINE SULFATE AND AMPHETAMINE SULFATE 3.75; 3.75; 3.75; 3.75 MG/1; MG/1; MG/1; MG/1
TABLET ORAL
Qty: 150 TABLET | Refills: 0 | Status: CANCELLED | OUTPATIENT
Start: 2022-12-07

## 2022-12-07 NOTE — PROGRESS NOTES
Joe is a 42 year old who is being evaluated via a billable video visit.      How would you like to obtain your AVS? MyChart  If the video visit is dropped, the invitation should be resent by: Text to cell phone: [phone]  Will anyone else be joining your video visit? No          Assessment & Plan     Attention deficit disorder of adult with hyperactivity  Doing well at current dose of 60 mg daily.  Continue with current dose of medication.  Last refill was for 15 mg tablets due to a shortage of the 30 mg tabs.  Prescriptions for 2 months has been written.  Follow-up in 4 months or sooner as needed  - amphetamine-dextroamphetamine (ADDERALL) 15 MG tablet; Take 2 tablets (30 mg) by mouth 2 times daily    Major depressive disorder, recurrent episode, mild (H)  Controlled, continue current dose of escitalopram    CAROLYN (generalized anxiety disorder)  Controlled, continue with current dose of escitalopram  - escitalopram (LEXAPRO) 10 MG tablet; Take 1 tablet (10 mg) by mouth daily                 No follow-ups on file.    Kevin Izaguirre MD  [facility]    Subjective   Joe is a 42 year old accompanied by his self, presenting for the following health issues:  A.D.H.D (Verbal consent given for video visit.  Refill meds, working well, discuss Lexapro dose also )      HPI     Medication Followup of ADHD and depression/anxiety    Taking Medication as prescribed: yes    Side Effects:  None    Medication Helping Symptoms:  yes    Currently doing well at dose of 60 mg daily.  He will continue with medication as prescribed.  Anxiety is controlled with current dose of escitalopram.  PDMP reviewed no concerns    Review of Systems   Constitutional, HEENT, cardiovascular, pulmonary, gi and gu systems are negative, except as otherwise noted.      Objective           Vitals:  No vitals were obtained today due to virtual visit.    Physical Exam   GENERAL: Healthy, alert and no distress  EYES: Eyes  grossly normal to inspection.  No discharge or erythema, or obvious scleral/conjunctival abnormalities.  RESP: No audible wheeze, cough, or visible cyanosis.  No visible retractions or increased work of breathing.    SKIN: Visible skin clear. No significant rash, abnormal pigmentation or lesions.  NEURO: Cranial nerves grossly intact.  Mentation and speech appropriate for age.  PSYCH: Mentation appears normal, affect normal/bright, judgement and insight intact, normal speech and appearance well-groomed.                Video-Visit Details    Video Start Time: 10:55 AM    Type of service:  Video Visit    Video End Time:11:02 AM    Originating Location (pt. Location): Other At work        Distant Location (provider location):  On-site    Platform used for Video Visit: [employer]el

## 2023-02-08 DIAGNOSIS — F90.9 ATTENTION DEFICIT DISORDER OF ADULT WITH HYPERACTIVITY: ICD-10-CM

## 2023-02-08 DIAGNOSIS — F41.1 GAD (GENERALIZED ANXIETY DISORDER): ICD-10-CM

## 2023-02-08 NOTE — TELEPHONE ENCOUNTER
Call from patient.    lexapro 10mg  He states Goblirsch verbally told him to increased dose to 2 Qday. He needs new RX     Adderrall 15mg  patient states total should be 150 total  Please update RX.

## 2023-02-09 RX ORDER — DEXTROAMPHETAMINE SACCHARATE, AMPHETAMINE ASPARTATE, DEXTROAMPHETAMINE SULFATE AND AMPHETAMINE SULFATE 3.75; 3.75; 3.75; 3.75 MG/1; MG/1; MG/1; MG/1
30 TABLET ORAL 2 TIMES DAILY
Qty: 120 TABLET | Refills: 0 | Status: SHIPPED | OUTPATIENT
Start: 2023-02-09 | End: 2023-04-03

## 2023-02-09 RX ORDER — ESCITALOPRAM OXALATE 10 MG/1
10 TABLET ORAL DAILY
Qty: 90 TABLET | Refills: 1 | Status: SHIPPED | OUTPATIENT
Start: 2023-02-09 | End: 2023-10-10

## 2023-03-13 ENCOUNTER — TELEPHONE (OUTPATIENT)
Dept: FAMILY MEDICINE | Facility: CLINIC | Age: 43
End: 2023-03-13
Payer: COMMERCIAL

## 2023-03-13 DIAGNOSIS — F90.9 ATTENTION DEFICIT DISORDER OF ADULT WITH HYPERACTIVITY: ICD-10-CM

## 2023-03-13 RX ORDER — DEXTROAMPHETAMINE SACCHARATE, AMPHETAMINE ASPARTATE, DEXTROAMPHETAMINE SULFATE AND AMPHETAMINE SULFATE 3.75; 3.75; 3.75; 3.75 MG/1; MG/1; MG/1; MG/1
15 TABLET ORAL DAILY
Qty: 30 TABLET | Refills: 0 | Status: SHIPPED | OUTPATIENT
Start: 2023-03-13 | End: 2023-04-03 | Stop reason: ALTCHOICE

## 2023-03-13 NOTE — TELEPHONE ENCOUNTER
03/13/23  Medication Question or Refill        What medication are you calling about (include dose and sig)?: Adderall 15 mg. Pt states he takes 2 30mg per day and an additional 15mg in the evening. Pt was unable to get the 15 mg because of a shortage and is now requesting this.    Preferred Pharmacy:  [facility][id number] - [place], [place] - [address] [facility] [address]  [address]  [place] [address]  Phone: [phone] Fax: [phone]      Controlled Substance Agreement on file:   CSA -- Patient Level:    CSA: None found at the patient level.       Who prescribed the medication?: Goblirsch    Do you need a refill? Yes    When did you use the medication last? Pt states it has been awhile as it is only needed prn. About a couple months    Patient offered an appointment? Yes: pt declined    Do you have any questions or concerns?  No      Could we send this information to you in [employer] or would you prefer to receive a phone call?:   Patient would prefer a phone call   Okay to leave a detailed message?: Yes at Home number on file [phone] (home)

## 2023-03-16 ENCOUNTER — TELEPHONE (OUTPATIENT)
Dept: FAMILY MEDICINE | Facility: CLINIC | Age: 43
End: 2023-03-16
Payer: COMMERCIAL

## 2023-03-16 DIAGNOSIS — F90.9 ATTENTION DEFICIT DISORDER OF ADULT WITH HYPERACTIVITY: Primary | ICD-10-CM

## 2023-03-16 NOTE — TELEPHONE ENCOUNTER
Medication Question or Refill      What medication are you calling about (include dose and sig)?:     120 tablets  - 15 mgs baseline AM & noon (was switched d/t shortage of 30 mg of dosage of medication)    30 tablets  -15 mgs prn -Patient has not being able to pick this up for months per patient d/t patients insurance declining to fill it.    Patient wants to consider switching dosage or to ER, or wondering if script could be written that patients daily dose is for 75 mg      Preferred Pharmacy:  [place] [address]  Phone: [phone] Fax: [phone]      Controlled Substance Agreement on file:   CSA -- Patient Level:    CSA: None found at the patient level.       Who prescribed the medication?: Goblirsch    Do you need a refill? Yes    When did you use the medication last? today    Patient offered an appointment? No, RN told the patient that an appointment will likely be needed for medication change and dose adjustment    Do you have any questions or concerns?  No      Could we send this information to you in [facility] or would you prefer to receive a phone call?:   Patient would prefer a phone call   Okay to leave a detailed message?: Yes at Cell number on file:    Telephone Information:   Mobile [phone]       TEAGAN Tolentino  [facility]

## 2023-03-20 RX ORDER — LISDEXAMFETAMINE DIMESYLATE 50 MG/1
50 CAPSULE ORAL EVERY MORNING
Qty: 30 CAPSULE | Refills: 0 | Status: SHIPPED | OUTPATIENT
Start: 2023-03-20 | End: 2023-04-03 | Stop reason: ALTCHOICE

## 2023-03-20 NOTE — ADDENDUM NOTE
Addended by: CASI CHAVEZ on: 3/20/2023 08:08 AM     Modules accepted: Orders     Libtayo Pregnancy And Lactation Text: This medication is contraindicated in pregnancy and when breast feeding.

## 2023-04-03 ENCOUNTER — TELEPHONE (OUTPATIENT)
Dept: FAMILY MEDICINE | Facility: CLINIC | Age: 43
End: 2023-04-03
Payer: COMMERCIAL

## 2023-04-03 DIAGNOSIS — F90.9 ATTENTION DEFICIT DISORDER OF ADULT WITH HYPERACTIVITY: ICD-10-CM

## 2023-04-03 DIAGNOSIS — F90.9 ATTENTION DEFICIT DISORDER OF ADULT WITH HYPERACTIVITY: Primary | ICD-10-CM

## 2023-04-03 RX ORDER — DEXTROAMPHETAMINE SACCHARATE, AMPHETAMINE ASPARTATE, DEXTROAMPHETAMINE SULFATE AND AMPHETAMINE SULFATE 7.5; 7.5; 7.5; 7.5 MG/1; MG/1; MG/1; MG/1
30 TABLET ORAL 2 TIMES DAILY
Qty: 60 TABLET | Refills: 0 | Status: SHIPPED | OUTPATIENT
Start: 2023-04-03 | End: 2023-05-23

## 2023-04-03 NOTE — TELEPHONE ENCOUNTER
New Medication Request        What medication are you requesting?: Pt wants to go back to the adderall 30mg tablets 2x per day. Not the 15 mg tablets. Please switch over the prescription.    Reason for medication request: Pt was taking trhis befgore    Have you taken this medication before?: Yes:    Controlled Substance Agreement on file:   CSA -- Patient Level:    CSA: None found at the patient level.         Patient offered an appointment? No    Preferred Pharmacy:    [address] [address]  Phone: [phone] Fax: [phone]      Could we send this information to you in [employer] or would you prefer to receive a phone call?:   Patient would prefer a phone call   Okay to leave a detailed message?: Yes at Home number on file [phone] (home)

## 2023-04-04 RX ORDER — DEXTROAMPHETAMINE SACCHARATE, AMPHETAMINE ASPARTATE, DEXTROAMPHETAMINE SULFATE AND AMPHETAMINE SULFATE 3.75; 3.75; 3.75; 3.75 MG/1; MG/1; MG/1; MG/1
15 TABLET ORAL DAILY
Qty: 30 TABLET | Refills: 0 | OUTPATIENT
Start: 2023-04-04

## 2023-05-22 DIAGNOSIS — F90.9 ATTENTION DEFICIT DISORDER OF ADULT WITH HYPERACTIVITY: ICD-10-CM

## 2023-05-22 DIAGNOSIS — N52.9 ED (ERECTILE DYSFUNCTION): ICD-10-CM

## 2023-05-22 RX ORDER — SILDENAFIL CITRATE 20 MG/1
TABLET ORAL
Qty: 90 TABLET | Refills: 0 | Status: SHIPPED | OUTPATIENT
Start: 2023-05-22 | End: 2023-10-02

## 2023-05-22 NOTE — TELEPHONE ENCOUNTER
"    Last office visit: 12/7/2022 virtual    Future Appointments 5/22/2023 - 11/18/2023    None          Requested Prescriptions   Pending Prescriptions Disp Refills     sildenafil (REVATIO) 20 MG tablet [Pharmacy Med Name: SILDENAFIL 20MG TABLETS] 90 tablet 0     Sig: TAKE 1 TABLET(20 MG) BY MOUTH DAILY       Erectile Dysfuction Protocol Passed - 5/22/2023 10:14 AM        Passed - Absence of nitrates on medication list        Passed - Absence of Alpha Blockers on Med list        Passed - Recent (12 mo) or future (30 days) visit within the authorizing provider's specialty     Patient has had an office visit with the authorizing provider or a provider within the authorizing providers department within the previous 12 mos or has a future within next 30 days. See \"Patient Info\" tab in inbasket, or \"Choose Columns\" in Meds & Orders section of the refill encounter.              Passed - Medication is active on med list        Passed - Patient is age 18 or older                   "

## 2023-05-23 RX ORDER — DEXTROAMPHETAMINE SACCHARATE, AMPHETAMINE ASPARTATE, DEXTROAMPHETAMINE SULFATE AND AMPHETAMINE SULFATE 7.5; 7.5; 7.5; 7.5 MG/1; MG/1; MG/1; MG/1
30 TABLET ORAL 2 TIMES DAILY
Qty: 60 TABLET | Refills: 0 | Status: SHIPPED | OUTPATIENT
Start: 2023-05-23 | End: 2023-06-27

## 2023-06-22 DIAGNOSIS — F90.9 ATTENTION DEFICIT DISORDER OF ADULT WITH HYPERACTIVITY: ICD-10-CM

## 2023-06-22 RX ORDER — DEXTROAMPHETAMINE SACCHARATE, AMPHETAMINE ASPARTATE, DEXTROAMPHETAMINE SULFATE AND AMPHETAMINE SULFATE 7.5; 7.5; 7.5; 7.5 MG/1; MG/1; MG/1; MG/1
30 TABLET ORAL 2 TIMES DAILY
Qty: 60 TABLET | Refills: 0 | OUTPATIENT
Start: 2023-06-22

## 2023-06-22 NOTE — TELEPHONE ENCOUNTER
Medication Question or Refill    Contacts       Type Contact Phone/Fax    06/22/2023 03:17 PM CDT Phone (Incoming) Joe Jesus (Self) [phone] (H)          What medication are you calling about (include dose and sig)?:     Preferred Pharmacy:   [facility] [address] [place] [address] [address]  Phone: [phone] Fax: [phone]      Controlled Substance Agreement on file:   CSA -- Patient Level:    CSA: None found at the patient level.       Who prescribed the medication?: Dr. Izaguirre    Do you need a refill? Yes    Could we [facility] this [facility] you [facility] or would you prefer to receive a phone call?:   Patient would prefer a phone call   Okay to leave a detailed message?: Yes at Home number on file [phone] (home)

## 2023-06-23 NOTE — TELEPHONE ENCOUNTER
My chart message sent to pt informing refill has been denied d/t needing med check. Requested to schedule OV.

## 2023-06-27 ENCOUNTER — VIRTUAL VISIT (OUTPATIENT)
Dept: FAMILY MEDICINE | Facility: OTHER | Age: 43
End: 2023-06-27
Payer: COMMERCIAL

## 2023-06-27 DIAGNOSIS — F90.9 ATTENTION DEFICIT DISORDER OF ADULT WITH HYPERACTIVITY: ICD-10-CM

## 2023-06-27 PROCEDURE — 99214 OFFICE O/P EST MOD 30 MIN: CPT | Mod: VID | Performed by: STUDENT IN AN ORGANIZED HEALTH CARE EDUCATION/TRAINING PROGRAM

## 2023-06-27 RX ORDER — DEXTROAMPHETAMINE SACCHARATE, AMPHETAMINE ASPARTATE, DEXTROAMPHETAMINE SULFATE AND AMPHETAMINE SULFATE 7.5; 7.5; 7.5; 7.5 MG/1; MG/1; MG/1; MG/1
30 TABLET ORAL 2 TIMES DAILY
Qty: 60 TABLET | Refills: 0 | Status: SHIPPED | OUTPATIENT
Start: 2023-06-27 | End: 2023-08-02

## 2023-06-27 ASSESSMENT — PATIENT HEALTH QUESTIONNAIRE - PHQ9
SUM OF ALL RESPONSES TO PHQ QUESTIONS 1-9: 6
10. IF YOU CHECKED OFF ANY PROBLEMS, HOW DIFFICULT HAVE THESE PROBLEMS MADE IT FOR YOU TO DO YOUR WORK, TAKE CARE OF THINGS AT HOME, OR GET ALONG WITH OTHER PEOPLE: NOT DIFFICULT AT ALL
SUM OF ALL RESPONSES TO PHQ QUESTIONS 1-9: 6

## 2023-06-27 NOTE — PROGRESS NOTES
Joe is a 42 year old who is being evaluated via a billable video visit.      How would you like to obtain your AVS? MyChart  If the video visit is dropped, the invitation should be resent by: Text to cell phone: [phone]  Will anyone else be joining your video visit? No      Assessment & Plan     Attention deficit disorder of adult with hyperactivity  Has been very stable on his current dosage of 30 mg twice a day.  He did inquire about having a potential extra 15 mg tablet that he can take at the end of the day/later in the day, I would prefer to have him discuss this with his PCP that has been following him for quite some time for his prescription.  I will give him a one-time bridge prescription of 1 month and then have him follow-up with his PCP thereafter for future refills.  Patient does agree and understands.  Follow-up in 30 days with PCP.  - amphetamine-dextroamphetamine (ADDERALL) 30 MG tablet; Take 1 tablet (30 mg) by mouth 2 times daily        CHRISTY ROBERTS MD  [facility]   Joe is a 42 year old, presenting for the following health issues:  Recheck Medication (Adderall)         No data to display              History of Present Illness       Reason for visit:  Adderall Med check    He eats 0-1 servings of fruits and vegetables daily.He consumes 0 sweetened beverage(s) daily.He exercises with enough effort to increase his heart rate 60 or more minutes per day.  He exercises with enough effort to increase his heart rate 5 days per week. He is missing 1 dose(s) of medications per week.  He is not taking prescribed medications regularly due to other.    Today's PHQ-9         PHQ-9 Total Score: 6    PHQ-9 Q9 Thoughts of better off dead/self-harm past 2 weeks :   Not at all    How difficult have these problems made it for you to do your work, take care of things at home, or get along with other people: Not difficult at all       Medication Followup of Adderall-  Please confirm dosage with patient    Taking Medication as prescribed: yes    Side Effects:  None    Medication Helping Symptoms:  yes          Review of Systems   Constitutional, HEENT, cardiovascular, pulmonary, gi and gu systems are negative, except as otherwise noted.      Objective           Vitals:  No vitals were obtained today due to virtual visit.    Physical Exam   GENERAL: Healthy, alert and no distress  EYES: Eyes grossly normal to inspection.  No discharge or erythema, or obvious scleral/conjunctival abnormalities.  RESP: No audible wheeze, cough, or visible cyanosis.  No visible retractions or increased work of breathing.    SKIN: Visible skin clear. No significant rash, abnormal pigmentation or lesions.  NEURO: Cranial nerves grossly intact.  Mentation and speech appropriate for age.  PSYCH: Mentation appears normal, affect normal/bright, judgement and insight intact, normal speech and appearance well-groomed.            Video-Visit Details    Type of service:  Video Visit     Originating Location (pt. Location): Home    Distant Location (provider location):  Off-site  Platform used for Video Visit: [employer]

## 2023-08-01 DIAGNOSIS — F90.9 ATTENTION DEFICIT DISORDER OF ADULT WITH HYPERACTIVITY: ICD-10-CM

## 2023-08-01 RX ORDER — DEXTROAMPHETAMINE SACCHARATE, AMPHETAMINE ASPARTATE, DEXTROAMPHETAMINE SULFATE AND AMPHETAMINE SULFATE 7.5; 7.5; 7.5; 7.5 MG/1; MG/1; MG/1; MG/1
30 TABLET ORAL 2 TIMES DAILY
Qty: 60 TABLET | Refills: 0 | OUTPATIENT
Start: 2023-08-01

## 2023-08-01 NOTE — TELEPHONE ENCOUNTER
Medication Question or Refill        What medication are you calling about (include dose and sig)?:     Preferred Pharmacy:   [facility] [address] [place] [address] [address]  Phone: [phone] Fax: [phone]    Controlled Substance Agreement on file:   CSA -- Patient Level:    CSA: None found at the patient level.       Who prescribed the medication?: Dr. Izaguirre; pt had different provider address when PCP was out of clinic    Do you need a refill? Yes      Could we send this information to you in [employer] or would you prefer to receive a phone call?:   Patient would prefer a phone call   Okay to leave a detailed message?: Yes at Home number on file [phone] (home)   ---

## 2023-08-02 RX ORDER — DEXTROAMPHETAMINE SACCHARATE, AMPHETAMINE ASPARTATE, DEXTROAMPHETAMINE SULFATE AND AMPHETAMINE SULFATE 7.5; 7.5; 7.5; 7.5 MG/1; MG/1; MG/1; MG/1
30 TABLET ORAL 2 TIMES DAILY
Qty: 60 TABLET | Refills: 0 | Status: SHIPPED | OUTPATIENT
Start: 2023-08-02 | End: 2023-09-05

## 2023-08-02 NOTE — TELEPHONE ENCOUNTER
"Patient refuses visit. He says he had a virtual visit last month when Dr. Mccain was out of clinic. Explained to patient that the provider that he had a visit with said to follow  up with PCP in 30 days.     Patient says \"nothing has changed in the past 5 years.\" He does not want to be billed for another visit.  "

## 2023-08-15 NOTE — TELEPHONE ENCOUNTER
Will see pt in October for ADHD follow up.  Will need to update Controlled Substance Agreement at that time.

## 2023-08-31 DIAGNOSIS — F90.9 ATTENTION DEFICIT DISORDER OF ADULT WITH HYPERACTIVITY: ICD-10-CM

## 2023-08-31 RX ORDER — DEXTROAMPHETAMINE SACCHARATE, AMPHETAMINE ASPARTATE, DEXTROAMPHETAMINE SULFATE AND AMPHETAMINE SULFATE 7.5; 7.5; 7.5; 7.5 MG/1; MG/1; MG/1; MG/1
30 TABLET ORAL 2 TIMES DAILY
Qty: 60 TABLET | Refills: 0 | OUTPATIENT
Start: 2023-08-31

## 2023-08-31 NOTE — TELEPHONE ENCOUNTER
Medication Question or Refill  What medication are you calling about (include dose and sig)?:       Preferred Pharmacy:  [facility] [address] [place] [address] [address]  Phone: [phone] Fax: [phone]    Controlled Substance Agreement on file:   CSA -- Patient Level:    CSA: None found at the patient level.       Who prescribed the medication?: GTG    Do you need a refill? Yes    Could we send this information to you in [employer] or would you prefer to receive a phone call?:   Patient would prefer a phone call     Okay to leave a detailed message?: Yes at Cell number on file:    Telephone Information:   Mobile [phone]

## 2023-09-05 ENCOUNTER — TELEPHONE (OUTPATIENT)
Dept: FAMILY MEDICINE | Facility: CLINIC | Age: 43
End: 2023-09-05
Payer: COMMERCIAL

## 2023-09-05 DIAGNOSIS — F90.9 ATTENTION DEFICIT DISORDER OF ADULT WITH HYPERACTIVITY: ICD-10-CM

## 2023-09-05 RX ORDER — DEXTROAMPHETAMINE SACCHARATE, AMPHETAMINE ASPARTATE, DEXTROAMPHETAMINE SULFATE AND AMPHETAMINE SULFATE 7.5; 7.5; 7.5; 7.5 MG/1; MG/1; MG/1; MG/1
30 TABLET ORAL 2 TIMES DAILY
Qty: 60 TABLET | Refills: 0 | Status: SHIPPED | OUTPATIENT
Start: 2023-09-05 | End: 2023-10-02

## 2023-09-05 NOTE — TELEPHONE ENCOUNTER
Patient calling again.  He needs his Rx filled today. Patient had to leave work today. He is totally out of his adderall and can't miss work again tomorrow.

## 2023-09-05 NOTE — TELEPHONE ENCOUNTER
Routing to provider to advise on refill of Adderall 30mg  Patient requested medication refill on 8/31/23 and was denied d/t needing appointment.   Patient had virtual visit on 6/27/23 for ADHD follow up with another provider.        Last Written Prescription Date:  8/2/23  Last Fill Quantity: 60,  # refills: 0   last virtual visit: 12/7/2022

## 2023-10-02 DIAGNOSIS — F90.9 ATTENTION DEFICIT DISORDER OF ADULT WITH HYPERACTIVITY: ICD-10-CM

## 2023-10-02 DIAGNOSIS — N52.9 ED (ERECTILE DYSFUNCTION): ICD-10-CM

## 2023-10-02 NOTE — TELEPHONE ENCOUNTER
Medication Question or Refill        What medication are you calling about (include dose and sig)?: sidenafil and adderall    Preferred Pharmacy:       [facility] [address] [place] [address] [address]  Phone: [phone] Fax: [phone]        Controlled Substance Agreement on file:   CSA -- Patient Level:    CSA: None found at the patient level.       Who prescribed the medication?: Kevin Izaguirre    Do you need a refill? Yes    When did you use the medication last? Adderall last used was 10/02/2023 and patient has three pills left and Sidenafil a few days ago    Patient offered an appointment? Yes: Patient is scheduled 10/10/2023    Do you have any questions or concerns?  No      Could we send this information to you in [employer] [facility] or would you prefer to receive a phone call?:   Patient would prefer a phone call   Okay to leave a detailed message?: Yes at Cell number on file:    Telephone Information:   Mobile [phone]

## 2023-10-03 RX ORDER — SILDENAFIL CITRATE 20 MG/1
20 TABLET ORAL DAILY
Qty: 90 TABLET | Refills: 0 | Status: SHIPPED | OUTPATIENT
Start: 2023-10-03 | End: 2023-10-10

## 2023-10-03 RX ORDER — DEXTROAMPHETAMINE SACCHARATE, AMPHETAMINE ASPARTATE, DEXTROAMPHETAMINE SULFATE AND AMPHETAMINE SULFATE 7.5; 7.5; 7.5; 7.5 MG/1; MG/1; MG/1; MG/1
30 TABLET ORAL 2 TIMES DAILY
Qty: 60 TABLET | Refills: 0 | Status: SHIPPED | OUTPATIENT
Start: 2023-10-03 | End: 2023-10-10

## 2023-10-03 NOTE — TELEPHONE ENCOUNTER
Pt called back to follow-up on refill request.  Informed pt that RN routed to provider to address.  Pt states that he is out as of today. (Unsure if this applies to 1 or Both Rx's).

## 2023-10-10 ENCOUNTER — TELEPHONE (OUTPATIENT)
Dept: FAMILY MEDICINE | Facility: CLINIC | Age: 43
End: 2023-10-10

## 2023-10-10 ENCOUNTER — VIRTUAL VISIT (OUTPATIENT)
Dept: FAMILY MEDICINE | Facility: CLINIC | Age: 43
End: 2023-10-10
Payer: COMMERCIAL

## 2023-10-10 DIAGNOSIS — N52.9 ERECTILE DYSFUNCTION, UNSPECIFIED ERECTILE DYSFUNCTION TYPE: ICD-10-CM

## 2023-10-10 DIAGNOSIS — F90.9 ATTENTION DEFICIT DISORDER OF ADULT WITH HYPERACTIVITY: ICD-10-CM

## 2023-10-10 PROCEDURE — 99213 OFFICE O/P EST LOW 20 MIN: CPT | Mod: VID | Performed by: FAMILY MEDICINE

## 2023-10-10 RX ORDER — DEXTROAMPHETAMINE SACCHARATE, AMPHETAMINE ASPARTATE, DEXTROAMPHETAMINE SULFATE AND AMPHETAMINE SULFATE 7.5; 7.5; 7.5; 7.5 MG/1; MG/1; MG/1; MG/1
30 TABLET ORAL 2 TIMES DAILY
Qty: 60 TABLET | Refills: 0 | Status: SHIPPED | OUTPATIENT
Start: 2023-11-01 | End: 2023-11-30

## 2023-10-10 RX ORDER — DEXTROAMPHETAMINE SACCHARATE, AMPHETAMINE ASPARTATE, DEXTROAMPHETAMINE SULFATE AND AMPHETAMINE SULFATE 7.5; 7.5; 7.5; 7.5 MG/1; MG/1; MG/1; MG/1
30 TABLET ORAL 2 TIMES DAILY
Qty: 60 TABLET | Refills: 0 | Status: SHIPPED | OUTPATIENT
Start: 2023-11-30 | End: 2024-01-02

## 2023-10-10 RX ORDER — SILDENAFIL CITRATE 20 MG/1
20 TABLET ORAL DAILY
Qty: 90 TABLET | Refills: 1 | Status: SHIPPED | OUTPATIENT
Start: 2023-10-10 | End: 2024-03-05

## 2023-10-10 NOTE — PROGRESS NOTES
Joe is a 43 year old who is being evaluated via a billable video visit.      How would you like to obtain your AVS? MyChart  If the video visit is dropped, the invitation should be resent by: Text to cell phone: [phone]  Will anyone else be joining your video visit? No          Assessment & Plan     Attention deficit disorder of adult with hyperactivity  Doing well, continue same dose   Refill for 2 months   Follow up in 4 months   PDMP queried, no concerns      ED (erectile dysfunction)  Treated successfully  Refill sildenafil                 Kevin Izaguirre MD  [facility]   Joe is a 43 year old, presenting for the following health issues:  A.D.H.D (Verbal consent given for video visit.  ADHD follow up, refill meds.)        10/10/2023    11:07 AM   Additional Questions   Roomed by Victoria CULLEN CMA   Accompanied by Self       HPI     Medication Followup of ADHD  Taking Medication as prescribed: yes  Side Effects:  None  Medication Helping Symptoms:  yes        Review of Systems         Objective           Vitals:  No vitals were obtained today due to virtual visit.    Physical Exam   GENERAL: Healthy, alert and no distress  EYES: Eyes grossly normal to inspection.  No discharge or erythema, or obvious scleral/conjunctival abnormalities.  RESP: No audible wheeze, cough, or visible cyanosis.  No visible retractions or increased work of breathing.    SKIN: Visible skin clear. No significant rash, abnormal pigmentation or lesions.  NEURO: Cranial nerves grossly intact.  Mentation and speech appropriate for age.  PSYCH: Mentation appears normal, affect normal/bright, judgement and insight intact, normal speech and appearance well-groomed.                Video-Visit Details    Type of service:  Video Visit     Originating Location (pt. Location): Other at work in [place], [place]    Distant Location (provider location):  On-site  Platform used for Video Visit: [employer]

## 2023-10-10 NOTE — TELEPHONE ENCOUNTER
Received fax from [email or web address] regarding PA for sildenafil citrate 20 mg tablets.    Key:  [id number]      Please do PA as soon as possible.  Thanks.

## 2023-10-10 NOTE — LETTER
October 16, 2023      Joe Jesus  [address] [place] [address] [address] [place] [address]        To Whom It May Concern,     The patient takes sildenafil for medication induced erectile dysfunction.  He has taken this in the past with good success.  I consider medically necessary.      Sincerely,        Kevin Izaguirre MD

## 2023-10-13 NOTE — TELEPHONE ENCOUNTER
PA Initiation    Medication: SILDENAFIL CITRATE 20 MG PO TABS  Insurance Company: [employer] - Phone [phone] Fax [phone]  Pharmacy Filling the Rx: [facility] [id number] [place], [place] - [address] [place] [address]  Filling Pharmacy Phone: [phone]  Filling Pharmacy Fax: [phone]  Start Date: 10/13/2023    Plan requires call in to initiate PA. They will fax over question set. Rep advised if approved, patient will be restricted to fill with the [facility].

## 2023-10-13 NOTE — TELEPHONE ENCOUNTER
PRIOR AUTHORIZATION DENIED    Medication: SILDENAFIL CITRATE 20 MG PO TABS  Insurance Company: [employer] - Phone [phone] Fax [phone]  Denial Date: 10/13/2023  Denial Rational:     Appeal Information:     Patient Notified: No, clinic must notify on denials.

## 2023-10-17 NOTE — TELEPHONE ENCOUNTER
Appeal initiated by clinic per note below.     Per PA team SOP, PA Team is unable to manage any appeal attempt initiated by the clinic.    Clinic is responsible for this appeal.

## 2023-10-22 ENCOUNTER — HEALTH MAINTENANCE LETTER (OUTPATIENT)
Age: 43
End: 2023-10-22

## 2023-10-26 ENCOUNTER — TELEPHONE (OUTPATIENT)
Dept: FAMILY MEDICINE | Facility: CLINIC | Age: 43
End: 2023-10-26
Payer: COMMERCIAL

## 2023-10-26 DIAGNOSIS — F90.9 ATTENTION DEFICIT DISORDER OF ADULT WITH HYPERACTIVITY: Primary | ICD-10-CM

## 2023-10-26 RX ORDER — DEXTROAMPHETAMINE SACCHARATE, AMPHETAMINE ASPARTATE, DEXTROAMPHETAMINE SULFATE AND AMPHETAMINE SULFATE 3.75; 3.75; 3.75; 3.75 MG/1; MG/1; MG/1; MG/1
15 TABLET ORAL DAILY
Qty: 30 TABLET | Refills: 0 | Status: SHIPPED | OUTPATIENT
Start: 2023-10-26

## 2023-10-26 NOTE — TELEPHONE ENCOUNTER
[employer] - Phone [phone] Fax [phone]  CALLING BACK TO CHECK THE STATUS ON DISCUSSION BELOW     Call when completed: Phone [phone]     Reference #- [id number]

## 2023-10-26 NOTE — TELEPHONE ENCOUNTER
Medication Question or Refill    Contacts         Type Contact Phone/Fax    10/26/2023 03:24 PM CDT Phone (Incoming) Joe Jesus (Self) [phone] (H)            What medication are you calling about (include dose and sig)?:     Preferred Pharmacy:   [facility] [address] [place] [address] [address]  Phone: [phone] Fax: [phone]    Controlled Substance Agreement on file:   CSA -- Patient Level:    CSA: None found at the patient level.       Who prescribed the medication?: Keyon Goldman; Dr. Izaguirre    Do you need a refill? Yes,  hoping that with with the shortage now being somewhat resolved to have this filled at pharmacy along with the 30mg Rx pt takes        Could we send this information to you in [facility] or would you prefer to receive a phone call?:   Patient would prefer a phone call   Okay to leave a detailed message?: Yes at Home number on file [phone] (home)

## 2023-11-30 DIAGNOSIS — F90.9 ATTENTION DEFICIT DISORDER OF ADULT WITH HYPERACTIVITY: ICD-10-CM

## 2023-11-30 RX ORDER — DEXTROAMPHETAMINE SACCHARATE, AMPHETAMINE ASPARTATE, DEXTROAMPHETAMINE SULFATE AND AMPHETAMINE SULFATE 7.5; 7.5; 7.5; 7.5 MG/1; MG/1; MG/1; MG/1
30 TABLET ORAL 2 TIMES DAILY
Qty: 60 TABLET | Refills: 0 | Status: SHIPPED | OUTPATIENT
Start: 2023-11-30 | End: 2024-03-05

## 2024-01-02 DIAGNOSIS — F90.9 ATTENTION DEFICIT DISORDER OF ADULT WITH HYPERACTIVITY: ICD-10-CM

## 2024-01-02 RX ORDER — DEXTROAMPHETAMINE SACCHARATE, AMPHETAMINE ASPARTATE, DEXTROAMPHETAMINE SULFATE AND AMPHETAMINE SULFATE 7.5; 7.5; 7.5; 7.5 MG/1; MG/1; MG/1; MG/1
30 TABLET ORAL 2 TIMES DAILY
Qty: 60 TABLET | Refills: 0 | Status: SHIPPED | OUTPATIENT
Start: 2024-01-02 | End: 2024-02-02

## 2024-01-16 NOTE — TELEPHONE ENCOUNTER
Medication Question or Refill    Contacts         Type Contact Phone/Fax    11/30/2023 12:17 PM CST Phone (Incoming) Joe Jesus (Self) [phone] (H)            What medication are you calling about (include dose and sig)?: amphetamine-dextroamphetamine (ADDERALL) 30 MG tablet     Preferred Pharmacy:   [place] [address] [place] [address]  Phone: [phone] Fax: [phone]      Controlled Substance Agreement on file:   CSA -- Patient Level:    CSA: None found at the patient level.       Who prescribed the medication?: Dr. Izaguirre    Do you need a refill? Yes    When did you use the medication last? 11.29.23    Patient offered an appointment? No    Do you have any questions or concerns?  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 Lives alone, son lives close by. No hx of speech or swallow difficulties per EHR    SUBJECTIVE  Patient agreed to participate in therapy this date.  Patient verbally agrees to allow the following to be present during the session:  son    Swallow assessment deferrred as patient NPO in case of potential surgery (neurosurgery consulted).     Patient/family goals: maximize function   Pain at onset of session:   Patient does not demonstrate pain behaviors., Patient reports pain is not an issue/concern.    OBJECTIVE         Communication/Cognition  Orientation Level:    - Person: oriented    - Place: oriented    - Month: not oriented    - Year: not oriented    - Date: not oriented    - Day of week: not oriented    - Season: not oriented    - Reason for hospitalization: oriented with cues  Behavior/Social Interaction:    - Cooperates with tasks: intact (>90% accuracy)    - Maintains eye contact: mild impairment (75-89% accuracy)    - Affect: moderate impairment (50-74% accuracy)  Attention:      - Sustained attention: mild impairment (75-89% accuracy)  Expression-Verbal:     - Automatic social/serial: intact (>90% accuracy)     - Repetition-sentences: mild impairment (75-89% accuracy)    - Conversational discourse: impaired    - Conversation - Simple: impaired  Naming:      - Confrontation: moderate impairment (50-74% accuracy)    - Responsive: severe impairment (25-49% accuracy)    - Generative naming: severe impairment (25-49% accuracy)  Expression-Written:      - Hold writing utensil: able to hold writing utensil and using right hand dominant    -Shapes: intact (>90% accuracy)  Auditory Comprehension:      - Localizes to sound: Yes    - Word level - body part: mild  impairment (75-89% accuracy)    - Commands 2 unit: mild impairment (75-89% accuracy)    - Yes/No questions 2 unit: mild impairment (75-89% accuracy)    - Conversations - simple: mild impairment (75-89% accuracy)    - Effective techniques: repetition  Memory:       - New learning: severe impairment (25-49% accuracy)    - Immediate memory: intact (>90% accuracy)    - Delayed memory:  Severe impairment (25-49% accuracy)  Problem Solving:      - Abstract reasoning: severe impairment (25-49% accuracy)  Numerical Reasoning:      - Simple calculation: severe impairment (25-49% accuracy)        Test and Outcome Measures  David Cognitive Assessment (MoCA)   Is designed as a screening assessment for detecting cognitive impairment. The MoCA assesses the cognitive domains of attention and concentration, executive functions, memory, language, visuoconstructional skills, conceptual thinking, calculations, and orientation.     (Version 8.1 presented)  Visuospatial 1/5  Naming 1/3  Attention 1/6  Language 1/3  Abstraction 0/2  Delayed Recall 0/5  Orientation 1/6  Education point 1  Score 6  Education point: Add 1 point if the patient has completed 12 or fewer years of education  Interpretation: a score greater than or equal to 26 is considered within normal limits    Memory Index Score (MIS) = 1/15 For memory deficits due to retrieval failures, performance can be improved with a cue. For memory deficits due to encoding failures, performance does not improve with a cue.    MoCA test results DO NOT provide healthcare professionals with a diagnosis.  MoCA test results are to be considered in combination with and in light of all medical and other information that healthcare professionals have about their patients. As such, the test results cannot be the only measure used to diagnose a person presenting with cognitive complaints and shall only be interpreted by healthcare professionals with expertise in the cognitive field and in  accordance with applicable law.  In doing so, healthcare professionals should bear in mind that since other factors may come into play in interpreting the results of this test, results considered \"normal\" DO NOT necessarily mean there is no cognitive impairment and vice versa.  (https://www.mocatest.org/terms-of-use/)            Education:   - Present and ready to learn: patient and patient's family  Education provided during session:  - communication, cognition and role of SLP  - Results of above outlined education: Needs reinforcement    ASSESSMENT  Patient will benefit from skilled therapy to address listed impairments and functional limitations.      Discharge needs based on today's assessment:  - Current level of function: significantly below baseline level of function  - Therapy needs at discharge: therapy 1-3 times per week  - ADL / IADLs (activities / instrumental activities of daily living) requiring support at discharge: emergency responses, communication, household care, health/medication management and meal preparation  - Impairments that require further therapy intervention: communication and cognition    Patient seen for comm/cog assessment given new onset communciation and cognitive impairment. Administered the MoCA version 8.1 as well as informally assessed areas of auditory comprehension and written expression. Patient scored 6/30 which falls below WNL range for test parameters (26+/30=WNL). Deficit areas include visouspatial/executive function, naming, memory, attention, abstract reasoning and orientation. Patient may benefit from further speech involvement specifically to provide strategies for patient/family and to educate regarding speech/language/cognitive deficits and impact on ADLs. Will further develop plan pending results of neurosurgical consult.     PLAN (while hospitalized)  Target orientation,attention, basic command following and verbal expression. Provide compensatory strategies for  patient/family to increase communication in adls    SLP Frequency: 1-2 x per week    SLP Identified Barriers to Discharge: cognition  Interventions:  Communication/cognition therapy and patient/family education    Plan/Goal Agreement:  Family/significant other/caregiver agrees and patient agrees with goals and individualized plan of care      RECOMMENDATIONS     -Communication Cognition:          *Patient demonstrates acute communication and cognition deficits, will initiate speech therapy.  Patient will require 24/7 supervision at discharge.      GOALS  Review Date: 1/23/2024  Long Term Goals: (to be met by time of discharge from hospital)  1.  Patient will demonstrate language/thought processing skills such as problem solving, reasoning, and higher level executive functioning in order to improve safety and awareness in functional living environment at 80% accuracy.     2.  Patient will demonstrate sustained attention by responding to tasks or details for a duration of 5 minutes with 80% accuracy for ADL's.     3.  Patient will improve short term working memory for ADL's at 80% with compensatory strategies.     4.  Patient will develop verbal self expression and utilize compensatory strategies to communicate wants and needs effectively to maintain safety, and participate socially in functional living environment at 80% accuracy    Documented in the chart in the following areas:    Assessment.    Patient at End of Session:   Location: in bed  Safety measures: alarm system in place/re-engaged, call light within reach and lines intact  Handoff to: nurse      Therapy procedure time and total treatment time can be found documented on the Time Entry flowsheet

## 2024-01-17 ENCOUNTER — ANCILLARY PROCEDURE (OUTPATIENT)
Dept: GENERAL RADIOLOGY | Facility: CLINIC | Age: 44
End: 2024-01-17
Payer: COMMERCIAL

## 2024-01-17 ENCOUNTER — OFFICE VISIT (OUTPATIENT)
Dept: FAMILY MEDICINE | Facility: CLINIC | Age: 44
End: 2024-01-17
Payer: COMMERCIAL

## 2024-01-17 VITALS
TEMPERATURE: 97.9 F | DIASTOLIC BLOOD PRESSURE: 82 MMHG | OXYGEN SATURATION: 96 % | SYSTOLIC BLOOD PRESSURE: 122 MMHG | WEIGHT: 225.6 LBS | HEIGHT: 73 IN | HEART RATE: 87 BPM | BODY MASS INDEX: 29.9 KG/M2 | RESPIRATION RATE: 16 BRPM

## 2024-01-17 DIAGNOSIS — S69.92XA FINGER INJURY, LEFT, INITIAL ENCOUNTER: Primary | ICD-10-CM

## 2024-01-17 DIAGNOSIS — S69.92XA FINGER INJURY, LEFT, INITIAL ENCOUNTER: ICD-10-CM

## 2024-01-17 PROCEDURE — 99213 OFFICE O/P EST LOW 20 MIN: CPT | Mod: 25

## 2024-01-17 PROCEDURE — 90715 TDAP VACCINE 7 YRS/> IM: CPT

## 2024-01-17 PROCEDURE — 73140 X-RAY EXAM OF FINGER(S): CPT | Mod: TC | Performed by: RADIOLOGY

## 2024-01-17 PROCEDURE — 90471 IMMUNIZATION ADMIN: CPT

## 2024-01-17 RX ORDER — CEPHALEXIN 500 MG/1
500 CAPSULE ORAL 2 TIMES DAILY
Qty: 10 CAPSULE | Refills: 0 | Status: SHIPPED | OUTPATIENT
Start: 2024-01-17 | End: 2024-01-22

## 2024-01-17 ASSESSMENT — PATIENT HEALTH QUESTIONNAIRE - PHQ9
10. IF YOU CHECKED OFF ANY PROBLEMS, HOW DIFFICULT HAVE THESE PROBLEMS MADE IT FOR YOU TO DO YOUR WORK, TAKE CARE OF THINGS AT HOME, OR GET ALONG WITH OTHER PEOPLE: NOT DIFFICULT AT ALL
SUM OF ALL RESPONSES TO PHQ QUESTIONS 1-9: 2
SUM OF ALL RESPONSES TO PHQ QUESTIONS 1-9: 2

## 2024-01-17 NOTE — PROGRESS NOTES
"  Assessment & Plan     Finger injury, left, initial encounter  Left finger injury approximately 7 days ago with a hammer.  Patient is not sure if the finger was stubbed or smashed.  He has had pain and decreased sensation both of which have been improving.  He has swelling of the finger which is also been improving.  He does continue to have blood under nail bed and swelling along cuticle with erythema.  It does appear that there is mild infection.  Discussed debridement of this area likely would cause more problems, there is no purulent drainage or signs of purulence, no exudate.  Will treat with oral antibiotics and do finger to make sure that there is not any bone involvement, including fracture or infection.  Patient has full range of motion, able to move finger, and has not had fevers or red streaking from area.  - cephALEXin (KEFLEX) 500 MG capsule; Take 1 capsule (500 mg) by mouth 2 times daily for 5 days  - XR Finger Left G/E 2 Views; Future            BMI  Estimated body mass index is 29.97 kg/m  as calculated from the following:    Height as of this encounter: 1.848 m (6' 0.75\").    Weight as of this encounter: 102.3 kg (225 lb 9.6 oz).           Indira Diaz is a 43 year old, presenting for the following health issues:  Musculoskeletal Problem (Left ring finger injury x 7 days, hammer )      1/17/2024     1:13 PM   Additional Questions   Roomed by ISAIAH Anna     History of Present Illness       Reason for visit:  Smashed ring finger/left hand  Symptom onset:  3-7 days ago    He eats 0-1 servings of fruits and vegetables daily.He consumes 1 sweetened beverage(s) daily.He exercises with enough effort to increase his heart rate 60 or more minutes per day.  He exercises with enough effort to increase his heart rate 5 days per week. He is missing 1 dose(s) of medications per week.                   Objective    /82 (BP Location: Right arm, Patient Position: Sitting, Cuff Size: Adult Large)   " "Pulse 87   Temp 97.9  F (36.6  C) (Oral)   Resp 16   Ht 1.848 m (6' 0.75\")   Wt 102.3 kg (225 lb 9.6 oz)   SpO2 96%   BMI 29.97 kg/m    Body mass index is 29.97 kg/m .    Physical Exam  HENT:      Mouth/Throat:      Mouth: Mucous membranes are moist.   Eyes:      Extraocular Movements: Extraocular movements intact.      Conjunctiva/sclera: Conjunctivae normal.   Pulmonary:      Effort: Pulmonary effort is normal.   Skin:     General: Skin is warm and dry.      Capillary Refill: Capillary refill takes less than 2 seconds.   Neurological:      Mental Status: He is alert and oriented to person, place, and time.   Psychiatric:         Behavior: Behavior normal.         Thought Content: Thought content normal.                    Signed Electronically by: JAMAICA Man CNP    "

## 2024-01-18 ENCOUNTER — TELEPHONE (OUTPATIENT)
Dept: FAMILY MEDICINE | Facility: CLINIC | Age: 44
End: 2024-01-18
Payer: COMMERCIAL

## 2024-01-18 NOTE — TELEPHONE ENCOUNTER
Patient called and left message that there is a displaced fracture seen on x-ray from yesterday afternoon.  Patient advised to be seen by orthopedic urgent care, patient is left message with address for [facility] in [facility] where hand specialist that also comes to [place] is working this afternoon.  Patient left phone number to call back for clinic if any questions.    JAMAICA Man CNP on 1/18/2024 at 10:11 AM    Addendum: Attempted to contact patient a 2nd time, no answer.

## 2024-02-02 DIAGNOSIS — F90.9 ATTENTION DEFICIT DISORDER OF ADULT WITH HYPERACTIVITY: ICD-10-CM

## 2024-02-02 RX ORDER — DEXTROAMPHETAMINE SACCHARATE, AMPHETAMINE ASPARTATE, DEXTROAMPHETAMINE SULFATE AND AMPHETAMINE SULFATE 7.5; 7.5; 7.5; 7.5 MG/1; MG/1; MG/1; MG/1
30 TABLET ORAL 2 TIMES DAILY
Qty: 60 TABLET | Refills: 0 | Status: SHIPPED | OUTPATIENT
Start: 2024-02-02 | End: 2024-04-15

## 2024-02-02 NOTE — TELEPHONE ENCOUNTER
Medication Question or Refill    Contacts         Type Contact Phone/Fax    02/02/2024 03:00 PM CST Phone (Incoming) Joe Jesus (Self) [phone] (H)            What medication are you calling about (include dose and sig)?: eugene    Preferred Pharmacy:   [facility] [address] [place], [place] [facility] [address] [facility]  [address] [facility] [address]  Phone: [phone] Fax: [phone]      Controlled Substance Agreement on file:   CSA -- Patient Level:    CSA: None found at the patient level.       Who prescribed the medication?: pcp     Do you need a refill? Yes    When did you use the medication last? today    Patient offered an appointment? No    Do you have any questions or concerns?  No      Could we send this information to you in [facility] or would you prefer to receive a phone call?:   Patient would prefer a phone call   Okay to leave a detailed message?: Yes at Home number on file [phone] (home)

## 2024-03-05 DIAGNOSIS — F90.9 ATTENTION DEFICIT DISORDER OF ADULT WITH HYPERACTIVITY: ICD-10-CM

## 2024-03-05 DIAGNOSIS — N52.9 ERECTILE DYSFUNCTION, UNSPECIFIED ERECTILE DYSFUNCTION TYPE: ICD-10-CM

## 2024-03-05 RX ORDER — DEXTROAMPHETAMINE SACCHARATE, AMPHETAMINE ASPARTATE, DEXTROAMPHETAMINE SULFATE AND AMPHETAMINE SULFATE 7.5; 7.5; 7.5; 7.5 MG/1; MG/1; MG/1; MG/1
30 TABLET ORAL 2 TIMES DAILY
Qty: 60 TABLET | Refills: 0 | Status: SHIPPED | OUTPATIENT
Start: 2024-03-05 | End: 2024-05-08

## 2024-03-05 RX ORDER — SILDENAFIL CITRATE 20 MG/1
20 TABLET ORAL DAILY
Qty: 90 TABLET | Refills: 1 | Status: SHIPPED | OUTPATIENT
Start: 2024-03-05 | End: 2024-08-21

## 2024-03-05 NOTE — TELEPHONE ENCOUNTER
Medication Question or Refill    Contacts         Type Contact Phone/Fax    03/05/2024 11:35 AM CST Phone (Incoming) [place] Joe MENDEZ (Self) [phone] (H)            What medication are you calling about (include dose and sig)?: Adderall 30MG and Sildenafil 20MG    Preferred Pharmacy:   [place] [address] [place] [address]  [address] [facility] [address]  Phone: [phone] Fax: [phone]      Controlled Substance Agreement on file:   CSA -- Patient Level:    CSA: None found at the patient level.       Who prescribed the medication?: Dr. Izaguirre    Do you need a refill? Yes    When did you use the medication last? 03/05/24    Patient offered an appointment? No    Do you have any questions or concerns?  No      Could we send this information to you in [facility] or would you prefer to receive a phone call?:   Patient would prefer a phone call   Okay to leave a detailed message?: Yes at Cell number on file:    Telephone Information:   Mobile [phone]

## 2024-04-15 DIAGNOSIS — F90.9 ATTENTION DEFICIT DISORDER OF ADULT WITH HYPERACTIVITY: ICD-10-CM

## 2024-04-15 RX ORDER — DEXTROAMPHETAMINE SACCHARATE, AMPHETAMINE ASPARTATE, DEXTROAMPHETAMINE SULFATE AND AMPHETAMINE SULFATE 7.5; 7.5; 7.5; 7.5 MG/1; MG/1; MG/1; MG/1
30 TABLET ORAL 2 TIMES DAILY
Qty: 60 TABLET | Refills: 0 | Status: SHIPPED | OUTPATIENT
Start: 2024-04-15 | End: 2024-06-17

## 2024-05-08 ENCOUNTER — TELEPHONE (OUTPATIENT)
Dept: FAMILY MEDICINE | Facility: CLINIC | Age: 44
End: 2024-05-08
Payer: COMMERCIAL

## 2024-05-08 DIAGNOSIS — F90.9 ATTENTION DEFICIT DISORDER OF ADULT WITH HYPERACTIVITY: ICD-10-CM

## 2024-05-16 RX ORDER — DEXTROAMPHETAMINE SACCHARATE, AMPHETAMINE ASPARTATE, DEXTROAMPHETAMINE SULFATE AND AMPHETAMINE SULFATE 7.5; 7.5; 7.5; 7.5 MG/1; MG/1; MG/1; MG/1
30 TABLET ORAL 2 TIMES DAILY
Qty: 60 TABLET | Refills: 0 | Status: SHIPPED | OUTPATIENT
Start: 2024-05-16 | End: 2024-06-18

## 2024-05-16 NOTE — TELEPHONE ENCOUNTER
Order/Referral Request    Patient needs Prior Auth from [facility] [[email or web address]] for:       Who is requesting: Insurance      Orders being requested: Prior Auth    Reason service is needed/diagnosis: So insurance continues to cover meds.     Has this been discussed with Provider: Yes Patient states this has been done before.    Does patient have an appointment scheduled?: No    Could we send this information to you in [employer] or would you prefer to receive a phone call?:   Patient would prefer a phone call   Okay to leave a detailed message?: Yes at Home number on file [phone] (home)    
05/14/2024    Pt called to check on the status of the prior authorization for the Adderall. TC advised that we submitted a prior authorization on 05/08/2024 and we are still waiting on a response from his insurance company.       Mary Moses    
PA is on file. Not needed as the current one is valid until 2027. amphetamine-dextroamphetamine (ADDERALL) 30 MG tablet     Please send to:   [id number] [place] [facility] [place] - [address] [phone]     Patient is completely out.  
Prior Authorization Not Needed per Insurance  There is a PA on file 04/15/2024-04/15/2027    Medication: AMPHETAMINE-DEXTROAMPHETAMINE 30 MG PO TABS  Insurance Company: [employer] - Phone [phone] Fax [phone]  Expected CoPay: $    [id number] [place] [address]  Pharmacy Notified: Yes   Pharmacy does need a new prescription.  Patient Notified: Pharmacy will notify the patient.    
Prior Authorization Retail Medication Request    Medication/Dose:   Adderall 30 mg bid  Diagnosis and ICD code (if different than what is on RX):  F90.9    New/renewal/insurance change PA/secondary ins. PA:  Previously Tried and Failed:    Rationale:      Insurance   Primary: TEGAN STEPHEN  Insurance ID:  [id number]    Secondary (if applicable):  Insurance ID:      Pharmacy Information (if different than what is on RX)  Name:  [facility]  Phone:  [phone]  Fax:  [phone]   
You can access the FollowMyHealth Patient Portal offered by [facility] by registering at the following website: [email or web address]. By joining [facility]’s FollowMyHealth portal, you will also be able to view your health information using other applications (apps) compatible with our system.

## 2024-06-17 DIAGNOSIS — F90.9 ATTENTION DEFICIT DISORDER OF ADULT WITH HYPERACTIVITY: ICD-10-CM

## 2024-06-17 NOTE — TELEPHONE ENCOUNTER
Medication Question or Refill    Contacts         Type Contact Phone/Fax    06/17/2024 09:55 AM CDT Phone (Incoming) [place] Joe MENDEZ (Self) [phone] (H)            What medication are you calling about (include dose and sig)?: amphetamine-dextroamphetamine (ADDERALL) 30 MG tablet     Preferred Pharmacy:   [place] [address] [place] [address]  [address] [facility] [address]  Phone: [phone] Fax: [phone]      Controlled Substance Agreement on file:   CSA -- Patient Level:    CSA: None found at the patient level.       Who prescribed the medication?: Dr. Izaguirre    Do you need a refill? Yes    Patient offered an appointment? Yes: pt has a Virtual appt on Wed 6/19/2024      Could we send this information to you in [facility] or would you prefer to receive a phone call?:   Patient would prefer a phone call   Okay to leave a detailed message?: Yes at Home number on file [phone] (home)

## 2024-06-18 ENCOUNTER — MYC REFILL (OUTPATIENT)
Dept: FAMILY MEDICINE | Facility: CLINIC | Age: 44
End: 2024-06-18
Payer: COMMERCIAL

## 2024-06-18 DIAGNOSIS — F90.9 ATTENTION DEFICIT DISORDER OF ADULT WITH HYPERACTIVITY: ICD-10-CM

## 2024-06-18 DIAGNOSIS — N52.9 ERECTILE DYSFUNCTION, UNSPECIFIED ERECTILE DYSFUNCTION TYPE: ICD-10-CM

## 2024-06-18 RX ORDER — DEXTROAMPHETAMINE SACCHARATE, AMPHETAMINE ASPARTATE, DEXTROAMPHETAMINE SULFATE AND AMPHETAMINE SULFATE 7.5; 7.5; 7.5; 7.5 MG/1; MG/1; MG/1; MG/1
30 TABLET ORAL 2 TIMES DAILY
Qty: 60 TABLET | Refills: 0 | Status: SHIPPED | OUTPATIENT
Start: 2024-06-18 | End: 2024-08-22

## 2024-06-19 ENCOUNTER — VIRTUAL VISIT (OUTPATIENT)
Dept: FAMILY MEDICINE | Facility: CLINIC | Age: 44
End: 2024-06-19
Payer: COMMERCIAL

## 2024-06-19 DIAGNOSIS — F90.9 ATTENTION DEFICIT DISORDER OF ADULT WITH HYPERACTIVITY: Primary | ICD-10-CM

## 2024-06-19 PROCEDURE — 99213 OFFICE O/P EST LOW 20 MIN: CPT | Mod: 95 | Performed by: FAMILY MEDICINE

## 2024-06-19 RX ORDER — DEXTROAMPHETAMINE SACCHARATE, AMPHETAMINE ASPARTATE, DEXTROAMPHETAMINE SULFATE AND AMPHETAMINE SULFATE 7.5; 7.5; 7.5; 7.5 MG/1; MG/1; MG/1; MG/1
30 TABLET ORAL 2 TIMES DAILY
Qty: 60 TABLET | Refills: 0 | Status: SHIPPED | OUTPATIENT
Start: 2024-06-19

## 2024-06-19 RX ORDER — DEXTROAMPHETAMINE SACCHARATE, AMPHETAMINE ASPARTATE MONOHYDRATE, DEXTROAMPHETAMINE SULFATE AND AMPHETAMINE SULFATE 7.5; 7.5; 7.5; 7.5 MG/1; MG/1; MG/1; MG/1
30 CAPSULE, EXTENDED RELEASE ORAL 2 TIMES DAILY
Qty: 60 CAPSULE | Refills: 0 | Status: SHIPPED | OUTPATIENT
Start: 2024-07-18 | End: 2024-08-19

## 2024-06-19 RX ORDER — SILDENAFIL CITRATE 20 MG/1
20 TABLET ORAL DAILY
Qty: 90 TABLET | Refills: 1 | OUTPATIENT
Start: 2024-06-19

## 2024-06-19 ASSESSMENT — PATIENT HEALTH QUESTIONNAIRE - PHQ9
SUM OF ALL RESPONSES TO PHQ QUESTIONS 1-9: 5
SUM OF ALL RESPONSES TO PHQ QUESTIONS 1-9: 5

## 2024-06-19 NOTE — PROGRESS NOTES
"Joe is a 43 year old who is being evaluated via a billable video visit.    How would you like to obtain your AVS? MyChart  If the video visit is dropped, the invitation should be resent by: Text to cell phone: [phone]  Will anyone else be joining your video visit? No      Assessment & Plan     Attention deficit disorder of adult with hyperactivity  Doing well, continue same dose   Refill for 2 months   Follow up in 4 months   PDMP queried, no concerns    - amphetamine-dextroamphetamine (ADDERALL XR) 30 MG 24 hr capsule; Take 1 capsule (30 mg) by mouth 2 times daily          BMI  Estimated body mass index is 29.97 kg/m  as calculated from the following:    Height as of 1/17/24: 1.848 m (6' 0.75\").    Weight as of 1/17/24: 102.3 kg (225 lb 9.6 oz).             Subjective   Joe is a 43 year old, presenting for the following health issues:  A.D.H.D (ADHD follow up, refill meds.  Verbal consent given for video visit)      6/19/2024     8:38 AM   Additional Questions   Roomed by Victoria CULLEN CMA   Accompanied by Self     A.D.H.D    History of Present Illness       Reason for visit:  ADHD follow up    He eats 0-1 servings of fruits and vegetables daily.He consumes 0 sweetened beverage(s) daily.He exercises with enough effort to increase his heart rate 60 or more minutes per day.  He exercises with enough effort to increase his heart rate 7 days per week. He is missing 1 dose(s) of medications per week.  He is not taking prescribed medications regularly due to remembering to take.     Patent presents for attention deficit disorder follow up.  There have been no problems with the medication. The current dose is controlling symptoms. There are no other concerns.              Objective           Vitals:  No vitals were obtained today due to virtual visit.    Physical Exam   GENERAL: alert and no distress  EYES: Eyes grossly normal to inspection.  No discharge or erythema, or obvious scleral/conjunctival abnormalities.  RESP: " No audible wheeze, cough, or visible cyanosis.    SKIN: Visible skin clear. No significant rash, abnormal pigmentation or lesions.  NEURO: Cranial nerves grossly intact.  Mentation and speech appropriate for age.  PSYCH: Appropriate affect, tone, and pace of words          Video-Visit Details    Type of service:  Video Visit   Originating Location (pt. Location): Other work    Distant Location (provider location):  On-site  Platform used for Video Visit: [facility]  Signed Electronically by: Kevin Izaguirre MD

## 2024-08-19 ENCOUNTER — TELEPHONE (OUTPATIENT)
Dept: FAMILY MEDICINE | Facility: CLINIC | Age: 44
End: 2024-08-19
Payer: COMMERCIAL

## 2024-08-19 DIAGNOSIS — F90.9 ATTENTION DEFICIT DISORDER OF ADULT WITH HYPERACTIVITY: ICD-10-CM

## 2024-08-19 RX ORDER — DEXTROAMPHETAMINE SACCHARATE, AMPHETAMINE ASPARTATE MONOHYDRATE, DEXTROAMPHETAMINE SULFATE AND AMPHETAMINE SULFATE 7.5; 7.5; 7.5; 7.5 MG/1; MG/1; MG/1; MG/1
30 CAPSULE, EXTENDED RELEASE ORAL 2 TIMES DAILY
Qty: 60 CAPSULE | Refills: 0 | Status: SHIPPED | OUTPATIENT
Start: 2024-08-19

## 2024-08-19 NOTE — TELEPHONE ENCOUNTER
Medication Question or Refill        What medication are you calling about (include dose and sig)?: ADDERALL 30 mg 2x daily    Preferred Pharmacy:   [facility] [address] [place] [facility] [place] - [address]  [address] [place] [address]  Phone: [phone] Fax: [phone]      Controlled Substance Agreement on file:   CSA -- Patient Level:    CSA: None found at the patient level.       Who prescribed the medication?: Dr Izaguirre    Do you need a refill? Yes    When did you use the medication last? today    Patient offered an appointment? Yes:     Do you have any questions or concerns?  No      Could we send this information to you in [facility] or would you prefer to receive a phone call?:   Patient would prefer a phone call   Okay to leave a detailed message?: Yes at Cell number on file:    Telephone Information:   Mobile [phone]

## 2024-08-21 ENCOUNTER — MYC REFILL (OUTPATIENT)
Dept: FAMILY MEDICINE | Facility: CLINIC | Age: 44
End: 2024-08-21
Payer: COMMERCIAL

## 2024-08-21 DIAGNOSIS — N52.9 ERECTILE DYSFUNCTION, UNSPECIFIED ERECTILE DYSFUNCTION TYPE: ICD-10-CM

## 2024-08-21 NOTE — TELEPHONE ENCOUNTER
Routing refill request to provider for review/approval because:  Drug fails the [facility] refill protocol       Last Written Prescription Date:  3/5/24  Last Fill Quantity: 90,  # refills: 1   Last office visit: 1/17/2024 ; last virtual visit: 6/19/2024 with prescribing provider

## 2024-08-23 RX ORDER — SILDENAFIL CITRATE 20 MG/1
20 TABLET ORAL DAILY
Qty: 90 TABLET | Refills: 1 | Status: SHIPPED | OUTPATIENT
Start: 2024-08-23

## 2024-09-20 ENCOUNTER — MYC REFILL (OUTPATIENT)
Dept: FAMILY MEDICINE | Facility: CLINIC | Age: 44
End: 2024-09-20
Payer: COMMERCIAL

## 2024-09-20 DIAGNOSIS — F90.9 ATTENTION DEFICIT DISORDER OF ADULT WITH HYPERACTIVITY: ICD-10-CM

## 2024-09-20 NOTE — TELEPHONE ENCOUNTER
Routing refill request to provider for review/approval because:  Drug not on the [facility] refill protocol       Last Written Prescription Date:  8/22/24  Last Fill Quantity: 60,  # refills: 0   Last office visit: 1/17/2024 ; last virtual visit: 6/19/2024 with prescribing provider

## 2024-09-23 RX ORDER — DEXTROAMPHETAMINE SACCHARATE, AMPHETAMINE ASPARTATE, DEXTROAMPHETAMINE SULFATE AND AMPHETAMINE SULFATE 7.5; 7.5; 7.5; 7.5 MG/1; MG/1; MG/1; MG/1
30 TABLET ORAL 2 TIMES DAILY
Qty: 60 TABLET | Refills: 0 | Status: SHIPPED | OUTPATIENT
Start: 2024-09-23

## 2024-10-22 ENCOUNTER — VIRTUAL VISIT (OUTPATIENT)
Dept: FAMILY MEDICINE | Facility: CLINIC | Age: 44
End: 2024-10-22
Payer: COMMERCIAL

## 2024-10-22 ENCOUNTER — TELEPHONE (OUTPATIENT)
Dept: FAMILY MEDICINE | Facility: CLINIC | Age: 44
End: 2024-10-22

## 2024-10-22 DIAGNOSIS — Z12.11 SCREENING FOR COLON CANCER: Primary | ICD-10-CM

## 2024-10-22 DIAGNOSIS — F90.9 ATTENTION DEFICIT DISORDER OF ADULT WITH HYPERACTIVITY: ICD-10-CM

## 2024-10-22 PROCEDURE — 99441 PR PHYSICIAN TELEPHONE EVALUATION 5-10 MIN: CPT | Mod: 93 | Performed by: FAMILY MEDICINE

## 2024-10-22 RX ORDER — DEXTROAMPHETAMINE SACCHARATE, AMPHETAMINE ASPARTATE, DEXTROAMPHETAMINE SULFATE AND AMPHETAMINE SULFATE 7.5; 7.5; 7.5; 7.5 MG/1; MG/1; MG/1; MG/1
30 TABLET ORAL 2 TIMES DAILY
Qty: 60 TABLET | Refills: 0 | Status: SHIPPED | OUTPATIENT
Start: 2024-10-22

## 2024-10-22 RX ORDER — DEXTROAMPHETAMINE SACCHARATE, AMPHETAMINE ASPARTATE, DEXTROAMPHETAMINE SULFATE AND AMPHETAMINE SULFATE 7.5; 7.5; 7.5; 7.5 MG/1; MG/1; MG/1; MG/1
30 TABLET ORAL 2 TIMES DAILY
Qty: 60 TABLET | Refills: 0 | Status: SHIPPED | OUTPATIENT
Start: 2024-11-21

## 2024-10-22 NOTE — TELEPHONE ENCOUNTER
Patient calling to request refill of Adderall 30 mg. RN review of chart, it looks like patient is due for visit with Dr. Izaguirre. Assisted in scheduling for today, telephone visit 10/22/24 to discuss refill.     Virtual Visit 6/19/24

## 2024-10-22 NOTE — LETTER
[facility] [place]  10/22/24  Patient: Joe Jesus  YOB: 1980  Medical Record Number: [id number]                                                                                  Non-Opioid Controlled Substance Agreement    This is an agreement between you and your provider regarding safe and appropriate use of controlled substances prescribed by your care team. Controlled substances are?medicines that can cause physical and mental dependence (abuse).     There are strict laws about having and using these medicines. We here at [facility] are  committed to working with you in your efforts to get better. To support you in this work, we'll help you schedule regular office appointments for medicine refills. If we must cancel or change your appointment for any reason, we'll make sure you have enough medicine to last until your next appointment.     As a Provider, I will:   Listen carefully to your concerns while treating you with respect.   Recommend a treatment plan that I believe is in your best interest and may involve therapies other than medicine.    Talk with you often about the possible benefits and the risk of harm of any medicine that we prescribe for you.  Assess the safety of this medicine and check how well it works.    Provide a plan on how to taper (discontinue or go off) using this medicine if the decision is made to stop its use.      ::  As a Patient, I understand controlled substances:     Are prescribed by my care provider to help me function or work and manage my condition(s).?  Are strong medicines and can cause serious side effects.     Need to be taken exactly as prescribed.?Combining controlled substances with certain medicines or chemicals (such as illegal drugs, alcohol, sedatives, sleeping pills, and benzodiazepines) can be dangerous or even fatal.? If I stop taking my medicines suddenly, I may have severe withdrawal symptoms.     The risks, benefits, and  side effects of these medicine(s) were explained to me. I agree that:    I will take part in other treatments as advised by my care team. This may be psychiatry or counseling, physical therapy, behavioral therapy, group treatment or a referral to specialist.    I will keep all my appointments and understand this is part of the monitoring of controlled substances.?My care team may require an office visit for EVERY controlled substance refill. If I miss appointments or don t follow instructions, my care team may stop my medicine    I will take my medicines as prescribed. I will not change the dose or schedule unless my care team tells me to. There will be no refills if I run out early.      I may be asked to come to the clinic and complete a urine drug test or complete a pill count. If I don t give a urine sample or participate in a pill count, the care team may stop my medicine.    I will only receive controlled substance prescriptions from this clinic. If I am treated by another provider, I will tell them that I am taking controlled substances and that I have a treatment agreement with this provider. I will inform my [facility] care team within one business day if I am given a prescription for any controlled substance by another healthcare provider. My [facility] care team can contact other providers and pharmacists about my use of any medicines.    It is up to me to make sure that I don't run out of my medicines on weekends or holidays.?If my care team is willing to refill my prescription without a visit, I must request refills only during office hours. Refills may take up to 3 business days to process. I will use one pharmacy to fill all my controlled substance prescriptions. I will notify the clinic about any changes to my insurance or medicine availability.    I am responsible for my prescriptions. If the medicine/prescription is lost, stolen or destroyed, it will not be replaced.?I also agree not  to share controlled substance medicines with anyone.     I am aware I should not use any illegal or recreational drugs. I agree not to drink alcohol unless my care team says I can.     If I enroll in the [place] Medical Cannabis program, I will tell my care team before my next refill.    I will tell my care team right away if I become pregnant, have a new medical problem treated outside of my regular clinic, or have a change in my medicines.     I understand that this medicine can affect my thinking, judgment and reaction time.? Alcohol and drugs affect the brain and body, which can affect the safety of my driving. Being under the influence of alcohol or drugs can affect my decision-making, behaviors, personal safety and the safety of others. Driving while impaired (DWI) can occur if a person is driving, operating or in physical control of a car, motorcycle, boat, snowmobile, ATV, motorbike, off-road vehicle or any other motor vehicle ([place] Statute 169A.20). I understand the risk if I choose to drive or operate any vehicle or machinery.    I understand that if I do not follow any of the conditions above, my prescriptions or treatment may be stopped or changed.   I agree that my provider, clinic care team and pharmacy may work with any city, state or federal law enforcement agency that investigates the misuse, sale or other diversion of my controlled medicine. I will allow my provider to discuss my care with, or share a copy of, this agreement with any other treating provider, pharmacy or emergency room where I receive care.     I have read this agreement and have asked questions about anything I did not understand.    ________________________________________________________  Patient Signature - Joe Jesus     ___________________                   Date     ________________________________________________________  Provider Signature - Kevin Izaguirre MD       ___________________                   Date      ________________________________________________________  Witness Signature (required if provider not present while patient signing)          ___________________                   Date

## 2024-10-22 NOTE — PROGRESS NOTES
"Joe is a 44 year old who is being evaluated via a billable telephone visit.    What phone number would you like to be contacted at? [phone]  How would you like to obtain your AVS? Lexi  Originating Location (pt. Location): Home    Distant Location (provider location):  On-site    Assessment & Plan     Attention deficit disorder of adult with hyperactivity  Doing well, continue same dose   Refill for 2 months   Follow up in 4 months   PDMP queried, no concerns    - amphetamine-dextroamphetamine (ADDERALL) 30 MG tablet; Take 1 tablet (30 mg) by mouth 2 times daily.  - Colonoscopy Screening  Referral; Future  - amphetamine-dextroamphetamine (ADDERALL) 30 MG tablet; Take 1 tablet (30 mg) by mouth 2 times daily.    Screening for colon cancer  Colonoscopy ordered          BMI  Estimated body mass index is 29.97 kg/m  as calculated from the following:    Height as of 1/17/24: 1.848 m (6' 0.75\").    Weight as of 1/17/24: 102.3 kg (225 lb 9.6 oz).             Subjective   Joe is a 44 year old, presenting for the following health issues:  A.D.H.RAVI (Verbal consent given for telephone visit.   ADHD follow up, also discuss colonoscopy)        10/22/2024     4:26 PM   Additional Questions   Roomed by Victoria CULLEN CMA   Accompanied by Self     A.D.H.RAVI    History of Present Illness       Reason for visit:  Prescription Renewal/other    He eats 0-1 servings of fruits and vegetables daily.He consumes 0 sweetened beverage(s) daily.He exercises with enough effort to increase his heart rate 60 or more minutes per day.  He exercises with enough effort to increase his heart rate 6 days per week.   He is taking medications regularly.     Patent presents for attention deficit disorder follow up.  There have been no problems with the medication. The current dose is controlling symptoms. There are no other concerns.                Objective           Vitals:  No vitals were obtained today due to virtual visit.    Physical Exam "   General: Alert and no distress //Respiratory: No audible wheeze, cough, or shortness of breath // Psychiatric:  Appropriate affect, tone, and pace of words            Phone call duration: 8 minutes  Signed Electronically by: Kevin Izaguirre MD

## 2024-11-21 ENCOUNTER — MYC MEDICAL ADVICE (OUTPATIENT)
Dept: FAMILY MEDICINE | Facility: CLINIC | Age: 44
End: 2024-11-21
Payer: COMMERCIAL

## 2024-11-21 ENCOUNTER — MYC REFILL (OUTPATIENT)
Dept: FAMILY MEDICINE | Facility: CLINIC | Age: 44
End: 2024-11-21
Payer: COMMERCIAL

## 2024-11-21 DIAGNOSIS — F90.9 ATTENTION DEFICIT DISORDER OF ADULT WITH HYPERACTIVITY: ICD-10-CM

## 2024-11-21 RX ORDER — DEXTROAMPHETAMINE SACCHARATE, AMPHETAMINE ASPARTATE, DEXTROAMPHETAMINE SULFATE AND AMPHETAMINE SULFATE 7.5; 7.5; 7.5; 7.5 MG/1; MG/1; MG/1; MG/1
30 TABLET ORAL 2 TIMES DAILY
Qty: 60 TABLET | Refills: 0 | Status: CANCELLED | OUTPATIENT
Start: 2024-11-21

## 2024-12-14 ENCOUNTER — HEALTH MAINTENANCE LETTER (OUTPATIENT)
Age: 44
End: 2024-12-14

## 2024-12-23 DIAGNOSIS — F90.9 ATTENTION DEFICIT DISORDER OF ADULT WITH HYPERACTIVITY: ICD-10-CM

## 2024-12-23 NOTE — TELEPHONE ENCOUNTER
Medication Question or Refill        What medication are you calling about (include dose and sig)?: amphetamine-dextroamphetamine (ADDERALL) 30 MG tablet     Preferred Pharmacy:   [place] [address] [place] [facility] [address]  [address]  [facility] [address]  Phone: [phone] Fax: [phone]      Controlled Substance Agreement on file:   CSA -- Patient Level:    CSA: None found at the patient level.       Who prescribed the medication?: Dr. Izaguirre    Do you need a refill? Yes    When did you use the medication last? Am of 12/23/2024    Patient offered an appointment? No    Do you have any questions or concerns?  Yes: pt has 1.5 days of Rx left. At appt on 10/22/2024 Dr. Izaguirre stated:       Could we send this information to you in [facility] or would you prefer to receive a phone call?:   Patient would prefer a phone call   Okay to leave a detailed message?: Yes at Cell number on file:    Telephone Information:   Mobile [phone]

## 2024-12-24 RX ORDER — DEXTROAMPHETAMINE SACCHARATE, AMPHETAMINE ASPARTATE, DEXTROAMPHETAMINE SULFATE AND AMPHETAMINE SULFATE 7.5; 7.5; 7.5; 7.5 MG/1; MG/1; MG/1; MG/1
30 TABLET ORAL 2 TIMES DAILY
Qty: 60 TABLET | Refills: 0 | Status: SHIPPED | OUTPATIENT
Start: 2024-12-24

## 2025-01-23 DIAGNOSIS — F90.9 ATTENTION DEFICIT DISORDER OF ADULT WITH HYPERACTIVITY: ICD-10-CM

## 2025-01-23 RX ORDER — DEXTROAMPHETAMINE SACCHARATE, AMPHETAMINE ASPARTATE, DEXTROAMPHETAMINE SULFATE AND AMPHETAMINE SULFATE 7.5; 7.5; 7.5; 7.5 MG/1; MG/1; MG/1; MG/1
30 TABLET ORAL 2 TIMES DAILY
Qty: 60 TABLET | Refills: 0 | Status: SHIPPED | OUTPATIENT
Start: 2025-01-23

## 2025-01-23 NOTE — TELEPHONE ENCOUNTER
Medication Question or Refill    Contacts       Contact Date/Time Type Contact Phone/Fax    01/23/2025 11:55 AM CST Phone (Incoming) Joe Jesus (Self) [phone] (H)        What medication are you calling about (include dose and sig)?: amphetamine-dextroamphetamine (ADDERALL) 30 MG tablet     Preferred Pharmacy:   [place] [address] [place] [address]  [address] [facility] [address]  Phone: [phone] Fax: [phone]    Controlled Substance Agreement on file:   CSA -- Patient Level:    CSA: None found at the patient level.       Who prescribed the medication?: Kevin Izaguirre MD     Do you need a refill? Yes    When did you use the medication last?  Has only tomorrow's dose left (3 pills left)    Patient offered an appointment? No    Do you have any questions or concerns?  No    Could we send this information to you in [facility] or would you prefer to receive a phone call?:   Patient would prefer a phone call   Okay to leave a detailed message?: Yes at Home number on file [phone] (home)

## 2025-02-27 DIAGNOSIS — F90.9 ATTENTION DEFICIT DISORDER OF ADULT WITH HYPERACTIVITY: ICD-10-CM

## 2025-02-27 RX ORDER — DEXTROAMPHETAMINE SACCHARATE, AMPHETAMINE ASPARTATE, DEXTROAMPHETAMINE SULFATE AND AMPHETAMINE SULFATE 7.5; 7.5; 7.5; 7.5 MG/1; MG/1; MG/1; MG/1
30 TABLET ORAL 2 TIMES DAILY
Qty: 60 TABLET | Refills: 0 | Status: SHIPPED | OUTPATIENT
Start: 2025-02-27

## 2025-02-27 NOTE — TELEPHONE ENCOUNTER
Medication Question or Refill        What medication are you calling about (include dose and sig)?: amphetamine-dextroamphetamine (ADDERALL) 30 MG tablet     Preferred Pharmacy:   [facility] [address] [place] [facility] - [place] - [address]  [address] [place] [address]  Phone: [phone] Fax: [phone]      Controlled Substance Agreement on file:   CSA -- Patient Level:    CSA: None found at the patient level.       Who prescribed the medication?: PCP    Do you need a refill? Yes    When did you use the medication last? today    Patient offered an appointment? No    Do you have any questions or concerns?  No      Could we send this information to you in [employer] or would you prefer to receive a phone call?:   Patient would like to be contacted via [employer]

## 2025-03-28 DIAGNOSIS — F90.9 ATTENTION DEFICIT DISORDER OF ADULT WITH HYPERACTIVITY: ICD-10-CM

## 2025-03-28 NOTE — TELEPHONE ENCOUNTER
Medication Question or Refill    Contacts       Contact Date/Time Type Contact Phone/Fax    03/28/2025 05:03 PM CDT Phone (Incoming) Joe Jesus (Self) [phone] (H)     ok to lv            What medication are you calling about (include dose and sig)?: Adderall     Preferred Pharmacy:   [facility] [address] [place] [facility] [address] [facility]  [address] [facility] [address]  Phone: [phone] Fax: [phone]      Controlled Substance Agreement on file:   CSA -- Patient Level:    CSA: None found at the patient level.       Who prescribed the medication?: Dr. Izaguirre    Do you need a refill? Yes    When did you use the medication last? Noon today    Patient offered an appointment? No    Do you have any questions or concerns?  No      Could we send this information to you in [facility] or would you prefer to receive a phone call?:   Patient would prefer a phone call   Okay to leave a detailed message?: N/A at Cell number on file:    Telephone Information:   Mobile [phone]

## 2025-03-31 RX ORDER — DEXTROAMPHETAMINE SACCHARATE, AMPHETAMINE ASPARTATE, DEXTROAMPHETAMINE SULFATE AND AMPHETAMINE SULFATE 7.5; 7.5; 7.5; 7.5 MG/1; MG/1; MG/1; MG/1
30 TABLET ORAL 2 TIMES DAILY
Qty: 60 TABLET | Refills: 0 | Status: SHIPPED | OUTPATIENT
Start: 2025-03-31

## 2025-03-31 NOTE — TELEPHONE ENCOUNTER
Left voicemail for patient.  When he returns call, please assist him on scheduling a visit in April for 6 month follow up/med check.

## 2025-03-31 NOTE — TELEPHONE ENCOUNTER
One refill sent. Patient needs appointment for medication follow up in April for 6 month follow up

## 2025-04-29 ENCOUNTER — VIRTUAL VISIT (OUTPATIENT)
Dept: FAMILY MEDICINE | Facility: CLINIC | Age: 45
End: 2025-04-29
Payer: COMMERCIAL

## 2025-04-29 DIAGNOSIS — F90.9 ATTENTION DEFICIT DISORDER OF ADULT WITH HYPERACTIVITY: ICD-10-CM

## 2025-04-29 PROCEDURE — 98005 SYNCH AUDIO-VIDEO EST LOW 20: CPT | Performed by: FAMILY MEDICINE

## 2025-04-29 RX ORDER — DEXTROAMPHETAMINE SACCHARATE, AMPHETAMINE ASPARTATE, DEXTROAMPHETAMINE SULFATE AND AMPHETAMINE SULFATE 7.5; 7.5; 7.5; 7.5 MG/1; MG/1; MG/1; MG/1
30 TABLET ORAL 2 TIMES DAILY
Qty: 60 TABLET | Refills: 0 | Status: SHIPPED | OUTPATIENT
Start: 2025-04-29

## 2025-04-29 RX ORDER — DEXTROAMPHETAMINE SACCHARATE, AMPHETAMINE ASPARTATE, DEXTROAMPHETAMINE SULFATE AND AMPHETAMINE SULFATE 7.5; 7.5; 7.5; 7.5 MG/1; MG/1; MG/1; MG/1
30 TABLET ORAL 2 TIMES DAILY
Qty: 60 TABLET | Refills: 0 | Status: SHIPPED | OUTPATIENT
Start: 2025-05-29

## 2025-04-29 ASSESSMENT — PATIENT HEALTH QUESTIONNAIRE - PHQ9
SUM OF ALL RESPONSES TO PHQ QUESTIONS 1-9: 0
SUM OF ALL RESPONSES TO PHQ QUESTIONS 1-9: 0

## 2025-04-29 NOTE — LETTER
[facility] [place]  04/28/25  Patient: Joe Jesus  YOB: 1980  Medical Record Number: [id number]                                                                                  Non-Opioid Controlled Substance Agreement    This is an agreement between you and your provider regarding safe and appropriate use of controlled substances prescribed by your care team. Controlled substances are?medicines that can cause physical and mental dependence (abuse).     There are strict laws about having and using these medicines. We here at [facility] are  committed to working with you in your efforts to get better. To support you in this work, we'll help you schedule regular office appointments for medicine refills. If we must cancel or change your appointment for any reason, we'll make sure you have enough medicine to last until your next appointment.     As a Provider, I will:   Listen carefully to your concerns while treating you with respect.   Recommend a treatment plan that I believe is in your best interest and may involve therapies other than medicine.    Talk with you often about the possible benefits and the risk of harm of any medicine that we prescribe for you.  Assess the safety of this medicine and check how well it works.    Provide a plan on how to taper (discontinue or go off) using this medicine if the decision is made to stop its use.      ::  As a Patient, I understand controlled substances:     Are prescribed by my care provider to help me function or work and manage my condition(s).?  Are strong medicines and can cause serious side effects.     Need to be taken exactly as prescribed.?Combining controlled substances with certain medicines or chemicals (such as illegal drugs, alcohol, sedatives, sleeping pills, and benzodiazepines) can be dangerous or even fatal.? If I stop taking my medicines suddenly, I may have severe withdrawal symptoms.     The risks, benefits, and  side effects of these medicine(s) were explained to me. I agree that:    I will take part in other treatments as advised by my care team. This may be psychiatry or counseling, physical therapy, behavioral therapy, group treatment or a referral to specialist.    I will keep all my appointments and understand this is part of the monitoring of controlled substances.?My care team may require an office visit for EVERY controlled substance refill. If I miss appointments or don t follow instructions, my care team may stop my medicine    I will take my medicines as prescribed. I will not change the dose or schedule unless my care team tells me to. There will be no refills if I run out early.      I may be asked to come to the clinic and complete a urine drug test or complete a pill count. If I don t give a urine sample or participate in a pill count, the care team may stop my medicine.    I will only receive controlled substance prescriptions from this clinic. If I am treated by another provider, I will tell them that I am taking controlled substances and that I have a treatment agreement with this provider. I will inform my [facility] care team within one business day if I am given a prescription for any controlled substance by another healthcare provider. My [facility] care team can contact other providers and pharmacists about my use of any medicines.    It is up to me to make sure that I don't run out of my medicines on weekends or holidays.?If my care team is willing to refill my prescription without a visit, I must request refills only during office hours. Refills may take up to 3 business days to process. I will use one pharmacy to fill all my controlled substance prescriptions. I will notify the clinic about any changes to my insurance or medicine availability.    I am responsible for my prescriptions. If the medicine/prescription is lost, stolen or destroyed, it will not be replaced.?I also agree not  to share controlled substance medicines with anyone.     I am aware I should not use any illegal or recreational drugs. I agree not to drink alcohol unless my care team says I can.     If I enroll in the [place] Medical Cannabis program, I will tell my care team before my next refill.    I will tell my care team right away if I become pregnant, have a new medical problem treated outside of my regular clinic, or have a change in my medicines.     I understand that this medicine can affect my thinking, judgment and reaction time.? Alcohol and drugs affect the brain and body, which can affect the safety of my driving. Being under the influence of alcohol or drugs can affect my decision-making, behaviors, personal safety and the safety of others. Driving while impaired (DWI) can occur if a person is driving, operating or in physical control of a car, motorcycle, boat, snowmobile, ATV, motorbike, off-road vehicle or any other motor vehicle ([place] Statute 169A.20). I understand the risk if I choose to drive or operate any vehicle or machinery.    I understand that if I do not follow any of the conditions above, my prescriptions or treatment may be stopped or changed.   I agree that my provider, clinic care team and pharmacy may work with any city, state or federal law enforcement agency that investigates the misuse, sale or other diversion of my controlled medicine. I will allow my provider to discuss my care with, or share a copy of, this agreement with any other treating provider, pharmacy or emergency room where I receive care.     I have read this agreement and have asked questions about anything I did not understand.    ________________________________________________________  Patient Signature - Joe Jesus     ___________________                   Date     ________________________________________________________  Provider Signature - Kevin Izaguirre MD       ___________________                   Date      ________________________________________________________  Witness Signature (required if provider not present while patient signing)          ___________________                   Date

## 2025-04-29 NOTE — PROGRESS NOTES
Joe is a 44 year old who is being evaluated via a billable video visit.    How would you like to obtain your AVS? MyChart  If the video visit is dropped, the invitation should be resent by: Text to cell phone: [phone]  Will anyone else be joining your video visit? No      Assessment & Plan     Attention deficit disorder of adult with hyperactivity  Doing well, continue same dose   Refill for 2 months   Follow up in 4 months   PDMP queried, no concerns    - amphetamine-dextroamphetamine (ADDERALL) 30 MG tablet; Take 1 tablet (30 mg) by mouth 2 times daily.  - amphetamine-dextroamphetamine (ADDERALL) 30 MG tablet; Take 1 tablet (30 mg) by mouth 2 times daily.        MED REC REQUIRED  Post Medication Reconciliation Status:           Subjective   Joe is a 44 year old, presenting for the following health issues:  A.D.H.D (Verbal consent given for video visit.  ADHD follow up, refill meds)      4/29/2025     3:26 PM   Additional Questions   Roomed by Victoria CULLEN CMA   Accompanied by self     A.D.H.D        Medication Followup of ADHD  Taking Medication as prescribed: yes  Side Effects:  None  Medication Helping Symptoms:  yes              Objective           Vitals:  No vitals were obtained today due to virtual visit.    Physical Exam   GENERAL: alert and no distress  EYES: Eyes grossly normal to inspection.  No discharge or erythema, or obvious scleral/conjunctival abnormalities.  RESP: No audible wheeze, cough, or visible cyanosis.    SKIN: Visible skin clear. No significant rash, abnormal pigmentation or lesions.  NEURO: Cranial nerves grossly intact.  Mentation and speech appropriate for age.  PSYCH: Appropriate affect, tone, and pace of words          Video-Visit Details    Type of service:  Video Visit   Originating Location (pt. Location): Home    Distant Location (provider location):  On-site  Platform used for Video Visit: Doximity  Signed Electronically by: Kevin Izaguirre MD    .undefined[^^  Answers  submitted by the patient for this visit:  Patient Health Questionnaire (Submitted on 4/29/2025)  PHQ9 TOTAL SCORE: 0

## 2025-06-30 DIAGNOSIS — F90.9 ATTENTION DEFICIT DISORDER OF ADULT WITH HYPERACTIVITY: ICD-10-CM

## 2025-06-30 RX ORDER — DEXTROAMPHETAMINE SACCHARATE, AMPHETAMINE ASPARTATE, DEXTROAMPHETAMINE SULFATE AND AMPHETAMINE SULFATE 7.5; 7.5; 7.5; 7.5 MG/1; MG/1; MG/1; MG/1
30 TABLET ORAL 2 TIMES DAILY
Qty: 60 TABLET | Refills: 0 | Status: SHIPPED | OUTPATIENT
Start: 2025-06-30

## 2025-06-30 NOTE — TELEPHONE ENCOUNTER
Medication Question or Refill        What medication are you calling about (include dose and sig)?: amphetamine-dextroamphetamine (ADDERALL) 30 MG tablet     Preferred Pharmacy:   [place] [address] [place] [facility] [place] - [address]  [address] [place] [address]  Phone: [phone] Fax: [phone]      Controlled Substance Agreement on file:   CSA -- Patient Level:    CSA: None found at the patient level.       Who prescribed the medication?: Ez    Do you need a refill? Yes    When did you use the medication last? Today     Patient offered an appointment? No    Do you have any questions or concerns?  Yes: WILL BE OUT OF MEDICATION TOMORROW.      Could we send this information to you in [employer] or would you prefer to receive a phone call?:   Patient would prefer a phone call   Okay to leave a detailed message?: Yes at Home number on file [phone] (home)

## 2025-07-30 DIAGNOSIS — F90.9 ATTENTION DEFICIT DISORDER OF ADULT WITH HYPERACTIVITY: ICD-10-CM

## 2025-07-30 RX ORDER — DEXTROAMPHETAMINE SACCHARATE, AMPHETAMINE ASPARTATE, DEXTROAMPHETAMINE SULFATE AND AMPHETAMINE SULFATE 7.5; 7.5; 7.5; 7.5 MG/1; MG/1; MG/1; MG/1
30 TABLET ORAL 2 TIMES DAILY
Qty: 60 TABLET | Refills: 0 | Status: SHIPPED | OUTPATIENT
Start: 2025-07-30

## 2025-07-30 NOTE — TELEPHONE ENCOUNTER
Medication Question or Refill        What medication are you calling about (include dose and sig)?: amphetamine-dextroamphetamine (ADDERALL) 30 MG tablet     Preferred Pharmacy:   [place] [address] [place] [facility] [place], [place] - [address]  [address] [place] [address]  Phone: [phone] Fax: [phone]      Controlled Substance Agreement on file:   CSA -- Patient Level:    CSA: None found at the patient level.       Who prescribed the medication?: Dr. Izaguirre    Do you need a refill? Yes    When did you use the medication last? na    Patient offered an appointment? No    Do you have any questions or concerns?  Yes: Patient has 2 tablets left and needs fill by tomorrow before he goes camping in the evening.       Could we send this information to you in [employer] or would you prefer to receive a phone call?:   Patient would prefer a phone call   Okay to leave a detailed message?: Yes at Home number on file [phone] (home)

## 2025-09-02 DIAGNOSIS — F90.9 ATTENTION DEFICIT DISORDER OF ADULT WITH HYPERACTIVITY: ICD-10-CM

## 2025-09-02 RX ORDER — DEXTROAMPHETAMINE SACCHARATE, AMPHETAMINE ASPARTATE, DEXTROAMPHETAMINE SULFATE AND AMPHETAMINE SULFATE 7.5; 7.5; 7.5; 7.5 MG/1; MG/1; MG/1; MG/1
30 TABLET ORAL 2 TIMES DAILY
Qty: 60 TABLET | Refills: 0 | Status: SHIPPED | OUTPATIENT
Start: 2025-09-02